# Patient Record
Sex: MALE | Race: WHITE | NOT HISPANIC OR LATINO | Employment: OTHER | URBAN - METROPOLITAN AREA
[De-identification: names, ages, dates, MRNs, and addresses within clinical notes are randomized per-mention and may not be internally consistent; named-entity substitution may affect disease eponyms.]

---

## 2023-01-05 ENCOUNTER — OFFICE VISIT (OUTPATIENT)
Dept: URGENT CARE | Facility: CLINIC | Age: 48
End: 2023-01-05
Payer: OTHER GOVERNMENT

## 2023-01-05 VITALS
RESPIRATION RATE: 18 BRPM | TEMPERATURE: 98 F | HEIGHT: 70 IN | BODY MASS INDEX: 32.21 KG/M2 | SYSTOLIC BLOOD PRESSURE: 127 MMHG | OXYGEN SATURATION: 98 % | DIASTOLIC BLOOD PRESSURE: 76 MMHG | HEART RATE: 71 BPM | WEIGHT: 225 LBS

## 2023-01-05 DIAGNOSIS — J30.9 ALLERGIC RHINITIS, UNSPECIFIED SEASONALITY, UNSPECIFIED TRIGGER: Primary | ICD-10-CM

## 2023-01-05 DIAGNOSIS — J02.9 SORE THROAT: ICD-10-CM

## 2023-01-05 LAB
CTP QC/QA: YES
MOLECULAR STREP A: NEGATIVE
POC MOLECULAR INFLUENZA A AGN: NEGATIVE
POC MOLECULAR INFLUENZA B AGN: NEGATIVE
SARS-COV-2 AG RESP QL IA.RAPID: NEGATIVE

## 2023-01-05 PROCEDURE — 87651 STREP A DNA AMP PROBE: CPT | Mod: QW,S$GLB,, | Performed by: PHYSICIAN ASSISTANT

## 2023-01-05 PROCEDURE — 87651 POCT STREP A MOLECULAR: ICD-10-PCS | Mod: QW,S$GLB,, | Performed by: PHYSICIAN ASSISTANT

## 2023-01-05 PROCEDURE — 87502 POCT INFLUENZA A/B MOLECULAR: ICD-10-PCS | Mod: QW,S$GLB,, | Performed by: PHYSICIAN ASSISTANT

## 2023-01-05 PROCEDURE — 87502 INFLUENZA DNA AMP PROBE: CPT | Mod: QW,S$GLB,, | Performed by: PHYSICIAN ASSISTANT

## 2023-01-05 PROCEDURE — 87811 SARS-COV-2 COVID19 W/OPTIC: CPT | Mod: QW,S$GLB,, | Performed by: PHYSICIAN ASSISTANT

## 2023-01-05 PROCEDURE — 87811 SARS CORONAVIRUS 2 ANTIGEN POCT, MANUAL READ: ICD-10-PCS | Mod: QW,S$GLB,, | Performed by: PHYSICIAN ASSISTANT

## 2023-01-05 PROCEDURE — 99213 OFFICE O/P EST LOW 20 MIN: CPT | Mod: S$GLB,,, | Performed by: PHYSICIAN ASSISTANT

## 2023-01-05 PROCEDURE — 99213 PR OFFICE/OUTPT VISIT, EST, LEVL III, 20-29 MIN: ICD-10-PCS | Mod: S$GLB,,, | Performed by: PHYSICIAN ASSISTANT

## 2023-01-05 RX ORDER — OMEPRAZOLE 20 MG/1
20 CAPSULE, DELAYED RELEASE ORAL DAILY
COMMUNITY

## 2023-01-05 RX ORDER — SERTRALINE HYDROCHLORIDE 100 MG/1
100 TABLET, FILM COATED ORAL DAILY
COMMUNITY

## 2023-01-05 RX ORDER — NAPROXEN 250 MG/1
250 TABLET ORAL 2 TIMES DAILY
COMMUNITY

## 2023-01-05 NOTE — PROGRESS NOTES
" Subjective:       Patient ID: Paulino Marcial is a 47 y.o. male.    Vitals:  height is 5' 10" (1.778 m) and weight is 102.1 kg (225 lb). His oral temperature is 98.3 °F (36.8 °C). His blood pressure is 127/76 and his pulse is 71. His respiration is 18 and oxygen saturation is 98%.     Chief Complaint: Sinus Problem (3 days)    Pt presents with sore throat for past 3 days. He also c/o sinus pressure, cough, congestion and headaches.    Denies fever, body aches, or chills.  No known sick contacts.  Requesting flu, covid and strep test today.    Sinus Problem  This is a new problem. The current episode started yesterday. The problem has been waxing and waning since onset. There has been no fever. His pain is at a severity of 4/10. Associated symptoms include congestion, coughing, ear pain, headaches, sinus pressure and a sore throat.     Constitution: Negative for fever.   HENT:  Positive for ear pain, congestion, sinus pressure and sore throat.    Respiratory:  Positive for cough.    Neurological:  Positive for headaches.     Objective:      Physical Exam   Constitutional: He is oriented to person, place, and time. He appears well-developed. He is cooperative.  Non-toxic appearance. He does not appear ill. No distress.   HENT:   Head: Normocephalic and atraumatic.   Ears:   Right Ear: Hearing, tympanic membrane, external ear and ear canal normal.   Left Ear: Hearing, tympanic membrane, external ear and ear canal normal.   Nose: Mucosal edema and rhinorrhea present. No nasal deformity. No epistaxis. Right sinus exhibits no maxillary sinus tenderness and no frontal sinus tenderness. Left sinus exhibits no maxillary sinus tenderness and no frontal sinus tenderness.   Mouth/Throat: Uvula is midline, oropharynx is clear and moist and mucous membranes are normal. No trismus in the jaw. Normal dentition. No uvula swelling. Cobblestoning present. No oropharyngeal exudate, posterior oropharyngeal edema or posterior oropharyngeal " erythema. No tonsillar exudate.   Eyes: Conjunctivae and lids are normal. No scleral icterus.   Neck: Trachea normal and phonation normal. Neck supple. No edema present. No erythema present. No neck rigidity present.   Cardiovascular: Normal rate, regular rhythm, normal heart sounds and normal pulses.   Pulmonary/Chest: Effort normal and breath sounds normal. No respiratory distress. He has no decreased breath sounds. He has no rhonchi.   Abdominal: Normal appearance.   Musculoskeletal: Normal range of motion.         General: No deformity. Normal range of motion.   Neurological: He is alert and oriented to person, place, and time. He exhibits normal muscle tone. Coordination normal.   Skin: Skin is warm, dry, intact, not diaphoretic and not pale.   Psychiatric: His speech is normal and behavior is normal. Judgment and thought content normal.   Nursing note and vitals reviewed.      Assessment:       1. Allergic rhinitis, unspecified seasonality, unspecified trigger    2. Sore throat          Plan:         Allergic rhinitis, unspecified seasonality, unspecified trigger    Sore throat  -     POCT Strep A, Molecular  -     SARS Coronavirus 2 Antigen, POCT Manual Read  -     POCT Influenza A/B MOLECULAR      Results for orders placed or performed in visit on 01/05/23   POCT Strep A, Molecular   Result Value Ref Range    Molecular Strep A, POC Negative Negative     Acceptable Yes    SARS Coronavirus 2 Antigen, POCT Manual Read   Result Value Ref Range    SARS Coronavirus 2 Antigen Negative Negative     Acceptable Yes    POCT Influenza A/B MOLECULAR   Result Value Ref Range    POC Molecular Influenza A Ag Negative Negative, Not Reported    POC Molecular Influenza B Ag Negative Negative, Not Reported     Acceptable Yes        Daily Flonase and antihistamine recommended.    Increase fluids.  Get plenty of rest.   Normal saline nasal wash to irrigate sinuses and for  congestion/runny nose.  Cool mist humidifier/vaporizer.  Practice good handwashing.  Mucinex for cough and chest congestion.  Tylenol or Ibuprofen for fever, headache and body aches.  Warm salt water gargles for throat comfort.  Chloraseptic spray or lozenges for throat comfort.  See PCP or go to ER if symptoms worsen or fail to improve with treatment.

## 2023-01-05 NOTE — LETTER
January 5, 2023      [facility]  [address]  [place] [address]  Phone: [phone]       Patient: Paulino Marcial   YOB: 1975  Date of Visit: 01/05/2023    To Whom It May Concern:    Jon Marcial  was at [facility] on 01/05/2023. The patient may return to work/school on 1/6/2023 with no restrictions. If you have any questions or concerns, or if I can be of further assistance, please do not hesitate to contact me.    Sincerely,      Jr Farah PA-C

## 2023-05-09 ENCOUNTER — HOSPITAL ENCOUNTER (EMERGENCY)
Facility: HOSPITAL | Age: 48
Discharge: HOME | End: 2023-05-09
Attending: EMERGENCY MEDICINE
Payer: OTHER GOVERNMENT

## 2023-05-09 VITALS
SYSTOLIC BLOOD PRESSURE: 141 MMHG | WEIGHT: 238 LBS | RESPIRATION RATE: 18 BRPM | HEART RATE: 84 BPM | BODY MASS INDEX: 34.15 KG/M2 | TEMPERATURE: 98 F | OXYGEN SATURATION: 98 % | DIASTOLIC BLOOD PRESSURE: 86 MMHG

## 2023-05-09 DIAGNOSIS — R51.9 NONINTRACTABLE HEADACHE, UNSPECIFIED CHRONICITY PATTERN, UNSPECIFIED HEADACHE TYPE: ICD-10-CM

## 2023-05-09 DIAGNOSIS — R42 DIZZINESS: Primary | ICD-10-CM

## 2023-05-09 DIAGNOSIS — T50.905A ADVERSE EFFECT OF DRUG, INITIAL ENCOUNTER: ICD-10-CM

## 2023-05-09 DIAGNOSIS — R03.0 ELEVATED BLOOD PRESSURE READING: ICD-10-CM

## 2023-05-09 PROBLEM — M25.551 CHRONIC PAIN OF BOTH HIPS: Status: ACTIVE | Noted: 2023-04-10

## 2023-05-09 PROBLEM — K21.9 GASTROESOPHAGEAL REFLUX DISEASE WITHOUT ESOPHAGITIS: Status: ACTIVE | Noted: 2023-04-10

## 2023-05-09 PROBLEM — M25.511 CHRONIC RIGHT SHOULDER PAIN: Status: ACTIVE | Noted: 2023-04-10

## 2023-05-09 PROBLEM — G89.29 CHRONIC RIGHT SHOULDER PAIN: Status: ACTIVE | Noted: 2023-04-10

## 2023-05-09 PROBLEM — M25.552 CHRONIC PAIN OF BOTH HIPS: Status: ACTIVE | Noted: 2023-04-10

## 2023-05-09 PROBLEM — G89.29 CHRONIC PAIN OF BOTH HIPS: Status: ACTIVE | Noted: 2023-04-10

## 2023-05-09 PROBLEM — F41.9 ANXIETY: Status: ACTIVE | Noted: 2023-04-10

## 2023-05-09 PROBLEM — J30.89 NON-SEASONAL ALLERGIC RHINITIS: Status: ACTIVE | Noted: 2023-04-10

## 2023-05-09 LAB
ALBUMIN SERPL BCP-MCNC: 4.8 G/DL (ref 3.5–5.2)
ALP SERPL-CCNC: 77 U/L (ref 38–126)
ALT SERPL W/O P-5'-P-CCNC: 96 U/L (ref 10–44)
ANION GAP SERPL CALC-SCNC: 9 MMOL/L (ref 8–16)
AST SERPL-CCNC: 44 U/L (ref 15–46)
BASOPHILS # BLD AUTO: 0.11 K/UL (ref 0–0.2)
BASOPHILS NFR BLD: 1.1 % (ref 0–1.9)
BILIRUB SERPL-MCNC: 0.5 MG/DL (ref 0.1–1)
BILIRUB UR QL STRIP: NEGATIVE
CALCIUM SERPL-MCNC: 9.2 MG/DL (ref 8.7–10.5)
CHLORIDE SERPL-SCNC: 102 MMOL/L (ref 95–110)
CLARITY UR REFRACT.AUTO: CLEAR
CO2 SERPL-SCNC: 30 MMOL/L (ref 23–29)
COLOR UR AUTO: YELLOW
CREAT SERPL-MCNC: 0.95 MG/DL (ref 0.5–1.4)
DIFFERENTIAL METHOD: ABNORMAL
EOSINOPHIL # BLD AUTO: 0.2 K/UL (ref 0–0.5)
EOSINOPHIL NFR BLD: 1.6 % (ref 0–8)
ERYTHROCYTE [DISTWIDTH] IN BLOOD BY AUTOMATED COUNT: 11.9 % (ref 11.5–14.5)
EST. GFR  (NO RACE VARIABLE): >60 ML/MIN/1.73 M^2
GLUCOSE SERPL-MCNC: 111 MG/DL (ref 70–110)
GLUCOSE UR QL STRIP: NEGATIVE
HCT VFR BLD AUTO: 45.6 % (ref 40–54)
HGB BLD-MCNC: 15.6 G/DL (ref 14–18)
HGB UR QL STRIP: NEGATIVE
IMM GRANULOCYTES # BLD AUTO: 0.08 K/UL (ref 0–0.04)
IMM GRANULOCYTES NFR BLD AUTO: 0.8 % (ref 0–0.5)
KETONES UR QL STRIP: NEGATIVE
LEUKOCYTE ESTERASE UR QL STRIP: NEGATIVE
LYMPHOCYTES # BLD AUTO: 1.9 K/UL (ref 1–4.8)
LYMPHOCYTES NFR BLD: 19.7 % (ref 18–48)
MCH RBC QN AUTO: 29.9 PG (ref 27–31)
MCHC RBC AUTO-ENTMCNC: 34.2 G/DL (ref 32–36)
MCV RBC AUTO: 87 FL (ref 82–98)
MONOCYTES # BLD AUTO: 0.9 K/UL (ref 0.3–1)
MONOCYTES NFR BLD: 9.5 % (ref 4–15)
NEUTROPHILS # BLD AUTO: 6.6 K/UL (ref 1.8–7.7)
NEUTROPHILS NFR BLD: 67.3 % (ref 38–73)
NITRITE UR QL STRIP: NEGATIVE
NRBC BLD-RTO: 0 /100 WBC
PH UR STRIP: 8 [PH] (ref 5–8)
PLATELET # BLD AUTO: 259 K/UL (ref 150–450)
PMV BLD AUTO: 10.3 FL (ref 9.2–12.9)
POTASSIUM SERPL-SCNC: 4.2 MMOL/L (ref 3.5–5.1)
PROT SERPL-MCNC: 7.8 G/DL (ref 6–8.4)
PROT UR QL STRIP: NEGATIVE
RBC # BLD AUTO: 5.22 M/UL (ref 4.6–6.2)
SODIUM SERPL-SCNC: 141 MMOL/L (ref 136–145)
SP GR UR STRIP: 1.01 (ref 1–1.03)
TROPONIN I SERPL-MCNC: <0.012 NG/ML (ref 0.01–0.03)
URN SPEC COLLECT METH UR: NORMAL
UROBILINOGEN UR STRIP-ACNC: NEGATIVE EU/DL
UUN UR-MCNC: 15 MG/DL (ref 2–20)
WBC # BLD AUTO: 9.76 K/UL (ref 3.9–12.7)

## 2023-05-09 PROCEDURE — 96374 THER/PROPH/DIAG INJ IV PUSH: CPT | Mod: ER

## 2023-05-09 PROCEDURE — 96361 HYDRATE IV INFUSION ADD-ON: CPT | Mod: ER

## 2023-05-09 PROCEDURE — 93010 EKG 12-LEAD: ICD-10-PCS | Mod: ,,, | Performed by: INTERNAL MEDICINE

## 2023-05-09 PROCEDURE — 80053 COMPREHEN METABOLIC PANEL: CPT | Mod: ER | Performed by: NURSE PRACTITIONER

## 2023-05-09 PROCEDURE — 81003 URINALYSIS AUTO W/O SCOPE: CPT | Mod: ER | Performed by: NURSE PRACTITIONER

## 2023-05-09 PROCEDURE — 94760 N-INVAS EAR/PLS OXIMETRY 1: CPT | Mod: ER

## 2023-05-09 PROCEDURE — 25000003 PHARM REV CODE 250: Mod: ER | Performed by: PHYSICIAN ASSISTANT

## 2023-05-09 PROCEDURE — 93010 ELECTROCARDIOGRAM REPORT: CPT | Mod: ,,, | Performed by: INTERNAL MEDICINE

## 2023-05-09 PROCEDURE — 93005 ELECTROCARDIOGRAM TRACING: CPT | Mod: ER

## 2023-05-09 PROCEDURE — 85025 COMPLETE CBC W/AUTO DIFF WBC: CPT | Mod: ER | Performed by: NURSE PRACTITIONER

## 2023-05-09 PROCEDURE — 63600175 PHARM REV CODE 636 W HCPCS: Mod: ER | Performed by: PHYSICIAN ASSISTANT

## 2023-05-09 PROCEDURE — 96375 TX/PRO/DX INJ NEW DRUG ADDON: CPT | Mod: ER

## 2023-05-09 PROCEDURE — 99285 EMERGENCY DEPT VISIT HI MDM: CPT | Mod: 25,ER

## 2023-05-09 PROCEDURE — 84484 ASSAY OF TROPONIN QUANT: CPT | Mod: ER | Performed by: NURSE PRACTITIONER

## 2023-05-09 PROCEDURE — 99900035 HC TECH TIME PER 15 MIN (STAT): Mod: ER

## 2023-05-09 RX ORDER — DIPHENHYDRAMINE HYDROCHLORIDE 50 MG/ML
12.5 INJECTION INTRAMUSCULAR; INTRAVENOUS
Status: COMPLETED | OUTPATIENT
Start: 2023-05-09 | End: 2023-05-09

## 2023-05-09 RX ORDER — MECLIZINE HCL 12.5 MG 12.5 MG/1
25 TABLET ORAL
Status: COMPLETED | OUTPATIENT
Start: 2023-05-09 | End: 2023-05-09

## 2023-05-09 RX ORDER — KETOROLAC TROMETHAMINE 30 MG/ML
15 INJECTION, SOLUTION INTRAMUSCULAR; INTRAVENOUS
Status: COMPLETED | OUTPATIENT
Start: 2023-05-09 | End: 2023-05-09

## 2023-05-09 RX ADMIN — MECLIZINE 25 MG: 12.5 TABLET ORAL at 04:05

## 2023-05-09 RX ADMIN — KETOROLAC TROMETHAMINE 15 MG: 30 INJECTION, SOLUTION INTRAMUSCULAR; INTRAVENOUS at 04:05

## 2023-05-09 RX ADMIN — SODIUM CHLORIDE 1000 ML: 0.9 INJECTION, SOLUTION INTRAVENOUS at 04:05

## 2023-05-09 RX ADMIN — DIPHENHYDRAMINE HYDROCHLORIDE 12.5 MG: 50 INJECTION INTRAMUSCULAR; INTRAVENOUS at 04:05

## 2023-05-09 NOTE — FIRST PROVIDER EVALUATION
" Emergency Department TeleTriage Encounter Note      CHIEF COMPLAINT    Chief Complaint   Patient presents with    Hypertension     I have been feeling weird since Thursday last week. I went to the clinic at work and my pressure was high and they told me to come here. It was 178/94. I do have PTSD and maybe that is contributing but I just dont feel right. I have tingle ness in the back my head I feel like my vision is off and getting nauseated here and there. It has been since Thursday but has gotten worse.         VITAL SIGNS   Initial Vitals [05/09/23 1502]   BP Pulse Resp Temp SpO2   (!) 154/94 83 18 98.2 °F (36.8 °C) 98 %      MAP       --            ALLERGIES    Review of patient's allergies indicates:   Allergen Reactions    Medrol [methylprednisolone] Anxiety       PROVIDER TRIAGE NOTE  This is a teletriage evaluation of a 48 y.o. male presenting to the ED complaining of elevated BP. Pt reports that he has been feeling "weird" since Thursday of last week. States that he had his BP checked at work today and it was elevated.  Denies CP and SOB. Reports headache since Thursday.  Also reports blurred vision. No pmhx of HTN.     Well-appearing, no distress.  No focal neuro findings.     Initial orders will be placed and care will be transferred to an alternate provider when patient is roomed for a full evaluation. Any additional orders and the final disposition will be determined by that provider.         ORDERS  Labs Reviewed - No data to display    ED Orders (720h ago, onward)      Start Ordered     Status Ordering Provider    05/09/23 1510 05/09/23 1510  Vital signs  Every 15 min         Ordered ROX GONZALES N.    05/09/23 1510 05/09/23 1510  Cardiac Monitoring - Adult  Continuous        Comments: Notify Physician If:    Ordered ROX GONZALES N.    05/09/23 1510 05/09/23 1510  Pulse Oximetry Continuous  Continuous         Ordered ROX GONZALES N.    05/09/23 1510 05/09/23 1510  Diet " NPO  Diet effective now         Ordered JANET ROX N.    05/09/23 1510 05/09/23 1510  Saline lock IV  Once         Ordered ROX GONZALES N.    05/09/23 1510 05/09/23 1510  EKG 12-lead  Once        Comments: Do not perform if previously done during this visit/ triage    Ordered ROX GONZALES N.    05/09/23 1510 05/09/23 1510  CBC auto differential  STAT         Ordered ROX GONZALES N.    05/09/23 1510 05/09/23 1510  Comprehensive metabolic panel  STAT         Ordered ROX GONZALES N.    05/09/23 1510 05/09/23 1510  Troponin I #1  STAT         Ordered ROX GONZALES N.    Unscheduled 05/09/23 1510  Urinalysis, Reflex to Urine Culture Urine, Clean Catch  STAT         Ordered ROX GONZALES N.              Virtual Visit Note: The provider triage portion of this emergency department evaluation and documentation was performed via [employer], a HIPAA-compliant telemedicine application, in concert with a tele-presenter in the room. A face to face patient evaluation with one of my colleagues will occur once the patient is placed in an emergency department room.      DISCLAIMER: This note was prepared with [employer] voice recognition transcription software. Garbled syntax, mangled pronouns, and other bizarre constructions may be attributed to that software system.

## 2023-05-09 NOTE — ED PROVIDER NOTES
"Encounter Date: 5/9/2023       History     Chief Complaint   Patient presents with    Hypertension     I have been feeling weird since Thursday last week. I went to the clinic at work and my pressure was high and they told me to come here. It was 178/94. I do have PTSD and maybe that is contributing but I just dont feel right. I have tingle ness in the back my head I feel like my vision is off and getting nauseated here and there. It has been since Thursday but has gotten worse.       HPI: Paulino Marcial, a 48 y.o. male  has a past medical history of Anxiety, Chronic post-traumatic stress disorder (PTSD), and GERD (gastroesophageal reflux disease).     He presents to the ED for evaluation of other constellation of symptoms.  States that he attest to feeling dizzy, has slight waxing and waning headache as well as some intermittent blurry vision that started on Thursday after he got a cortisone shot in his left shoulder.  States that he went to his job where they checked his blood pressure was found to be elevated.  He has no h/o hypertension.  States that when the last time he took steroids (a medrol dose pack) it opened "pandora's box" concerning his PTSD symptoms and required evaluation in the emergency room.  Denies unilateral numbness/weakness.  States that he is prescribed 100mg Zoloft, however he takes have a tablet and was instructed by his PCP to take a whole table during times of stress. Started to take the full dose of this medication on Thursday  after the onset of symptoms.  Attests to vaping.        The history is provided by the patient.   Review of patient's allergies indicates:   Allergen Reactions    Medrol [methylprednisolone] Anxiety     Past Medical History:   Diagnosis Date    Anxiety     Chronic post-traumatic stress disorder (PTSD)     GERD (gastroesophageal reflux disease)      Past Surgical History:   Procedure Laterality Date    APPENDECTOMY      HIP ARTHROPLASTY      KNEE ARTHROPLASTY   "     History reviewed. No pertinent family history.  Social History     Tobacco Use    Smoking status: Never    Smokeless tobacco: Current   Substance Use Topics    Alcohol use: Yes     Alcohol/week: 2.0 standard drinks     Types: 2 Cans of beer per week     Comment: daily    Drug use: Never     Review of Systems   Constitutional:  Negative for fever.   Eyes:  Positive for visual disturbance. Negative for photophobia.   Respiratory:  Negative for shortness of breath.    Cardiovascular:  Negative for chest pain.   Gastrointestinal:  Positive for nausea. Negative for vomiting.   Skin:  Negative for color change.   Allergic/Immunologic: Negative for immunocompromised state.   Neurological:  Positive for headaches. Negative for weakness and numbness.   Psychiatric/Behavioral:  Negative for agitation.    All other systems reviewed and are negative.    Physical Exam     Initial Vitals [05/09/23 1502]   BP Pulse Resp Temp SpO2   (!) 154/94 83 18 98.2 °F (36.8 °C) 98 %      MAP       --         Physical Exam    Nursing note and vitals reviewed.  Constitutional: He appears well-developed and well-nourished.   HENT:   Head: Normocephalic and atraumatic.   Right Ear: External ear normal.   Left Ear: External ear normal.   Nose: Nose normal.   Mouth/Throat: Oropharynx is clear and moist. Mucous membranes are dry.   Eyes: Right eye exhibits nystagmus. Left eye exhibits no nystagmus.   Neck: Neck supple.   Normal range of motion.  Cardiovascular:  Normal rate and regular rhythm.           Pulmonary/Chest: Breath sounds normal. No respiratory distress.   Abdominal: Abdomen is soft. He exhibits no distension. There is no abdominal tenderness. There is no rebound.   Musculoskeletal:         General: No tenderness or edema. Normal range of motion.      Cervical back: Normal range of motion and neck supple.     Lymphadenopathy:     He has no cervical adenopathy.   Neurological: He is alert and oriented to person, place, and time. No  cranial nerve deficit or sensory deficit. GCS score is 15. GCS eye subscore is 4. GCS verbal subscore is 5. GCS motor subscore is 6.   Skin: Skin is warm and dry. Capillary refill takes less than 2 seconds. No rash and no abscess noted. No erythema.   Psychiatric: He has a normal mood and affect. Thought content normal.       ED Course   Procedures  Labs Reviewed   CBC W/ AUTO DIFFERENTIAL - Abnormal; Notable for the following components:       Result Value    Immature Granulocytes 0.8 (*)     Immature Grans (Abs) 0.08 (*)     All other components within normal limits   COMPREHENSIVE METABOLIC PANEL - Abnormal; Notable for the following components:    CO2 30 (*)     Glucose 111 (*)     ALT 96 (*)     All other components within normal limits   TROPONIN I   URINALYSIS, REFLEX TO URINE CULTURE    Narrative:     Preferred Collection Type->Urine, Clean Catch  Specimen Source->Urine          Imaging Results              X-Ray Chest AP Portable (Final result)  Result time 05/09/23 15:56:50      Final result by Li Rob MD (05/09/23 15:56:50)                   Impression:      No acute abnormality.      Electronically signed by: Li Rob  Date:    05/09/2023  Time:    15:56               Narrative:    EXAMINATION:  XR CHEST AP PORTABLE    CLINICAL HISTORY:  elevated BP reading;    TECHNIQUE:  Single frontal view of the chest was performed.    COMPARISON:  None    FINDINGS:  The lungs are clear, with normal appearance of pulmonary vasculature and no pleural effusion or pneumothorax.    The cardiac silhouette is normal in size. The hilar and mediastinal contours are unremarkable.    Bones are intact.                                       CT Head Without Contrast (Final result)  Result time 05/09/23 15:34:51      Final result by Mauricio Perez MD (05/09/23 15:34:51)                   Impression:      Normal head CT.    All CT scans at this facility are performed  using dose modulation techniques as  appropriate to performed exam including the following:  automated exposure control; adjustment of mA and/or kV according to the patients size (this includes techniques or standardized protocols for targeted exams where dose is matched to indication/reason for exam: i.e. extremities or head);  iterative reconstruction technique.      Electronically signed by: Mauricio Perez MD  Date:    05/09/2023  Time:    15:34               Narrative:    EXAMINATION:  CT HEAD WITHOUT CONTRAST    CLINICAL HISTORY:  Hypertensive emergency;    TECHNIQUE:  Routine noncontrast head CT.    COMPARISON:  None    FINDINGS:  There is no acute intracranial hemorrhage or abnormal extra-axial fluid collection.    There is no abnormal increased or decreased density within the brain parenchyma.  Gray-white differentiation preserved normal ventricles.  There is no intracranial mass or mass effect.    The calvarium is intact.    Visualized paranasal sinuses and mastoid air cells are well aerated.                                       Medications   sodium chloride 0.9% bolus 1,000 mL 1,000 mL (1,000 mLs Intravenous New Bag 5/9/23 1617)   diphenhydrAMINE injection 12.5 mg (has no administration in time range)   meclizine tablet 25 mg (25 mg Oral Given 5/9/23 1617)   ketorolac injection 15 mg (15 mg Intravenous Given 5/9/23 1617)     Medical Decision Making:   Initial Assessment:   Dizziness, HA, Nausea   Differential Diagnosis:   Metabolic derangement, arrhythmia, dehydration, intracranial abnormality   Clinical Tests:   Lab Tests: Ordered and Reviewed  The following lab test(s) were unremarkable: CBC, CMP and Troponin  Radiological Study: Ordered and Reviewed  ED Management:  Pt presents to ED for evaluation of dizziness, HA with subjective decreased vision after cortisone injection.  No concerning abnormalities on CBC, CMP, trop, EKG, CXR and CT of head.  Pt given fluids, Toradol, meclizine and benadryl with improvement of HA.  Likely medication  reaction.  Pt was cautioned against further use of steroids.  Encouraged benadryl administration at home and given reasons to return.  Pt verbalized understanding and agreement with plan.  Case discussed with supervising physician who agrees with plan.                            Clinical Impression:   Final diagnoses:  [R03.0] Elevated blood pressure reading  [R42] Dizziness (Primary)  [T50.905A] Adverse effect of drug, initial encounter               Nichole Vo PA-C  05/09/23 [id number]

## 2023-05-09 NOTE — Clinical Note
"Paulino Nguyen" Aggie was seen and treated in our emergency department on 5/9/2023.  He may return to work on 05/10/2023.       If you have any questions or concerns, please don't hesitate to call.      Nichole Vo PA-C"

## 2023-09-05 ENCOUNTER — TELEPHONE (OUTPATIENT)
Dept: PSYCHIATRY | Facility: CLINIC | Age: 48
End: 2023-09-05
Payer: OTHER GOVERNMENT

## 2023-09-05 NOTE — TELEPHONE ENCOUNTER
----- Message from Nakia Puri sent at 9/5/2023 10:52 AM CDT -----  States he would like to schedule an appt w/ Dr Juhi Padron. Please call pt [phone]. Thank you

## 2023-10-18 ENCOUNTER — OFFICE VISIT (OUTPATIENT)
Dept: URGENT CARE | Facility: CLINIC | Age: 48
End: 2023-10-18
Payer: OTHER GOVERNMENT

## 2023-10-18 VITALS
BODY MASS INDEX: 33.11 KG/M2 | SYSTOLIC BLOOD PRESSURE: 144 MMHG | WEIGHT: 231.25 LBS | DIASTOLIC BLOOD PRESSURE: 88 MMHG | RESPIRATION RATE: 19 BRPM | HEART RATE: 79 BPM | TEMPERATURE: 98 F | HEIGHT: 70 IN | OXYGEN SATURATION: 99 %

## 2023-10-18 DIAGNOSIS — R09.81 NASAL CONGESTION: ICD-10-CM

## 2023-10-18 DIAGNOSIS — R51.9 NONINTRACTABLE HEADACHE, UNSPECIFIED CHRONICITY PATTERN, UNSPECIFIED HEADACHE TYPE: Primary | ICD-10-CM

## 2023-10-18 DIAGNOSIS — R42 DIZZINESS: ICD-10-CM

## 2023-10-18 LAB
CTP QC/QA: YES
CTP QC/QA: YES
POC MOLECULAR INFLUENZA A AGN: NEGATIVE
POC MOLECULAR INFLUENZA B AGN: NEGATIVE
SARS-COV-2 AG RESP QL IA.RAPID: NEGATIVE

## 2023-10-18 PROCEDURE — 99214 PR OFFICE/OUTPT VISIT, EST, LEVL IV, 30-39 MIN: ICD-10-PCS | Mod: S$GLB,,, | Performed by: PHYSICIAN ASSISTANT

## 2023-10-18 PROCEDURE — 99214 OFFICE O/P EST MOD 30 MIN: CPT | Mod: S$GLB,,, | Performed by: PHYSICIAN ASSISTANT

## 2023-10-18 PROCEDURE — 87502 POCT INFLUENZA A/B MOLECULAR: ICD-10-PCS | Mod: QW,S$GLB,, | Performed by: PHYSICIAN ASSISTANT

## 2023-10-18 PROCEDURE — 87811 SARS CORONAVIRUS 2 ANTIGEN POCT, MANUAL READ: ICD-10-PCS | Mod: QW,S$GLB,, | Performed by: PHYSICIAN ASSISTANT

## 2023-10-18 PROCEDURE — 87502 INFLUENZA DNA AMP PROBE: CPT | Mod: QW,S$GLB,, | Performed by: PHYSICIAN ASSISTANT

## 2023-10-18 PROCEDURE — 87811 SARS-COV-2 COVID19 W/OPTIC: CPT | Mod: QW,S$GLB,, | Performed by: PHYSICIAN ASSISTANT

## 2023-10-18 RX ORDER — LORAZEPAM 0.5 MG/1
0.5 TABLET ORAL DAILY PRN
COMMUNITY
Start: 2023-04-10

## 2023-10-18 RX ORDER — RIZATRIPTAN BENZOATE 10 MG/1
10 TABLET ORAL
COMMUNITY
Start: 2023-05-31

## 2023-10-18 RX ORDER — PROMETHAZINE HYDROCHLORIDE 25 MG/1
25 TABLET ORAL EVERY 6 HOURS PRN
Qty: 20 TABLET | Refills: 0 | Status: SHIPPED | OUTPATIENT
Start: 2023-10-18

## 2023-10-18 NOTE — PROGRESS NOTES
"Subjective:      Patient ID: Paulino Marcial is a 48 y.o. male.    Vitals:  height is 5' 10" (1.778 m) and weight is 104.9 kg (231 lb 4.2 oz). His oral temperature is 98.1 °F (36.7 °C). His blood pressure is 144/88 (abnormal) and his pulse is 79. His respiration is 19 and oxygen saturation is 99%.     Chief Complaint: Headache    Presents to urgent care with dizziness, headaches, and mild nasal congestion. Rates headache 6/10 but states currently does not have headache. Symptoms started on 10/16/23. Fever was 99.1 yesterday. Took BC powder & dayquil yesterday with no relief. HA usually frontal and comes & goes. Dizziness described as light-headedness, usually improves with rest. Does have hx of migraines- states it does not feel like typical migraine. Pt states feels like last time he had covid. Denies n/v, ear pain, hearing loss, focal weakness, vision changes, photophobia, cp/sob, fever/chills. No hx of vertigo.     Headache   This is a new problem. The current episode started in the past 7 days. The pain does not radiate. The pain quality is similar to prior headaches. The quality of the pain is described as dull. The pain is at a severity of 6/10. The pain is moderate. Associated symptoms include dizziness and muscle aches. Pertinent negatives include no abdominal pain, blurred vision, coughing, ear pain, fever, loss of balance, nausea, numbness, photophobia, seizures, sore throat, tingling, tinnitus, visual change, vomiting or weakness. Nothing aggravates the symptoms. He has tried NSAIDs for the symptoms. The treatment provided no relief. His past medical history is significant for migraine headaches.       Constitution: Positive for fatigue. Negative for chills, sweating and fever.   HENT:  Positive for congestion. Negative for ear pain, tinnitus and sore throat.    Cardiovascular: Negative.    Eyes: Negative.  Negative for photophobia, vision loss and blurred vision.   Respiratory:  Negative for cough and " shortness of breath.    Gastrointestinal: Negative.  Negative for abdominal pain, nausea and vomiting.   Neurological:  Positive for dizziness, headaches and history of migraines. Negative for history of vertigo, passing out, facial drooping, speech difficulty, loss of balance, numbness, tingling and seizures.      Objective:     Physical Exam   Constitutional: He is oriented to person, place, and time. He appears well-developed.  Non-toxic appearance. He does not appear ill. No distress.   HENT:   Head: Normocephalic and atraumatic.   Ears:   Right Ear: Tympanic membrane, external ear and ear canal normal.   Left Ear: Tympanic membrane, external ear and ear canal normal.   Nose: Nose normal.   Eyes: Conjunctivae, EOM and lids are normal. Pupils are equal, round, and reactive to light. Extraocular movement intact   Neck: Neck supple.   Cardiovascular: Normal rate, regular rhythm and normal heart sounds.   Pulmonary/Chest: Effort normal and breath sounds normal.   Abdominal: Normal appearance.   Musculoskeletal: Normal range of motion.         General: Normal range of motion.      Right lower leg: No edema.      Left lower leg: No edema.   Neurological: no focal deficit. He is alert and oriented to person, place, and time. He has normal sensation and intact cranial nerves (2-12). He displays no weakness, facial symmetry and no dysarthria. He has a normal Finger-Nose-Finger Test. He shows no pronator drift. Gait normal. GCS eye subscore is 4. GCS verbal subscore is 5. GCS motor subscore is 6.   Skin: Skin is warm, dry, not diaphoretic, not pale and no rash.   Psychiatric: His behavior is normal.     Results for orders placed or performed in visit on 10/18/23   SARS Coronavirus 2 Antigen, POCT Manual Read   Result Value Ref Range    SARS Coronavirus 2 Antigen Negative Negative     Acceptable Yes    POCT Influenza A/B MOLECULAR   Result Value Ref Range    POC Molecular Influenza A Ag Negative Negative,  Not Reported    POC Molecular Influenza B Ag Negative Negative, Not Reported     Acceptable Yes        Assessment:     1. Nonintractable headache, unspecified chronicity pattern, unspecified headache type    2. Dizziness    3. Nasal congestion        Plan:     NSAIDs vs Tylenol prn for headache/body ache. Pt also has maxalt at home if needed. Currently not having headache at time of visit. Neurologically intact. Denies cp, sob, or leg swelling. Use promethazine prn for dizziness. Informed pt this medication will cause drowsiness. Rest and drink plenty of fluids. Work note given for today and tomorrow. Close f/u with PCP or ED sooner if any new or worsening symptoms.     Nonintractable headache, unspecified chronicity pattern, unspecified headache type  -     SARS Coronavirus 2 Antigen, POCT Manual Read  -     POCT Influenza A/B MOLECULAR  -     promethazine (PHENERGAN) 25 MG tablet; Take 1 tablet (25 mg total) by mouth every 6 (six) hours as needed (dizziness).  Dispense: 20 tablet; Refill: 0    Dizziness  -     promethazine (PHENERGAN) 25 MG tablet; Take 1 tablet (25 mg total) by mouth every 6 (six) hours as needed (dizziness).  Dispense: 20 tablet; Refill: 0    Nasal congestion

## 2023-10-18 NOTE — LETTER
October 18, 2023      [facility]  [address]  [facility] [facility] [address]  Phone: [phone]       Patient: Paulino Marcial   YOB: 1975  Date of Visit: 10/18/2023    To Whom It May Concern:    Jon Marcial  was at [facility] on 10/18/2023. The patient may return to work/school on 10/20/23 with no restrictions. If you have any questions or concerns, or if I can be of further assistance, please do not hesitate to contact me.    Sincerely,    Martine Mathews PA-C

## 2023-10-21 ENCOUNTER — TELEPHONE (OUTPATIENT)
Dept: URGENT CARE | Facility: CLINIC | Age: 48
End: 2023-10-21
Payer: OTHER GOVERNMENT

## 2023-10-21 NOTE — TELEPHONE ENCOUNTER
Called patient as a courtesy to recent urgent care visit. Patient didn't answer. Left message informing patient of courtesy call and callback number.

## 2023-12-04 ENCOUNTER — TELEPHONE (OUTPATIENT)
Dept: PSYCHIATRY | Facility: CLINIC | Age: 48
End: 2023-12-04
Payer: OTHER GOVERNMENT

## 2023-12-04 NOTE — TELEPHONE ENCOUNTER
----- Message from Brenda Maldonado sent at 12/4/2023 11:42 AM CST -----  Name of Who is Calling:patient           What is the request in detail:requesting to schedule an appt with Karrie Olivas           Can the clinic reply by [facility]:no           What Number to Call Back if not in [facility]: [phone]

## 2024-01-01 ENCOUNTER — OFFICE VISIT (OUTPATIENT)
Dept: URGENT CARE | Facility: CLINIC | Age: 49
End: 2024-01-01
Payer: OTHER GOVERNMENT

## 2024-01-01 VITALS
TEMPERATURE: 98 F | BODY MASS INDEX: 33.07 KG/M2 | WEIGHT: 231 LBS | HEART RATE: 86 BPM | SYSTOLIC BLOOD PRESSURE: 141 MMHG | RESPIRATION RATE: 18 BRPM | HEIGHT: 70 IN | OXYGEN SATURATION: 99 % | DIASTOLIC BLOOD PRESSURE: 90 MMHG

## 2024-01-01 DIAGNOSIS — T23.101A SUPERFICIAL BURN OF RIGHT HAND INCLUDING FINGERS, INITIAL ENCOUNTER: Primary | ICD-10-CM

## 2024-01-01 DIAGNOSIS — F17.200 NEEDS SMOKING CESSATION EDUCATION: ICD-10-CM

## 2024-01-01 DIAGNOSIS — T23.131A SUPERFICIAL BURN OF RIGHT HAND INCLUDING FINGERS, INITIAL ENCOUNTER: Primary | ICD-10-CM

## 2024-01-01 PROCEDURE — 99213 OFFICE O/P EST LOW 20 MIN: CPT | Mod: S$GLB,,, | Performed by: PHYSICIAN ASSISTANT

## 2024-01-01 RX ORDER — SILVER SULFADIAZINE 10 G/1000G
CREAM TOPICAL 2 TIMES DAILY
Qty: 25 G | Refills: 0 | Status: SHIPPED | OUTPATIENT
Start: 2024-01-01

## 2024-01-01 RX ORDER — MUPIROCIN 20 MG/G
OINTMENT TOPICAL 3 TIMES DAILY
Qty: 30 G | Refills: 0 | Status: SHIPPED | OUTPATIENT
Start: 2024-01-01 | End: 2024-01-11

## 2024-01-01 RX ORDER — TESTOSTERONE CYPIONATE 200 MG/ML
200 INJECTION, SOLUTION INTRAMUSCULAR
COMMUNITY

## 2024-01-01 RX ORDER — SILVER SULFADIAZINE 10 G/1000G
CREAM TOPICAL
Status: COMPLETED | OUTPATIENT
Start: 2024-01-01 | End: 2024-01-01

## 2024-01-01 RX ADMIN — SILVER SULFADIAZINE: 10 CREAM TOPICAL at 05:01

## 2024-01-01 NOTE — PROGRESS NOTES
"Subjective:      Patient ID: Paulino Marcial is a 48 y.o. male.    Vitals:  height is 5' 10" (1.778 m) and weight is 104.8 kg (231 lb). His oral temperature is 98 °F (36.7 °C). His blood pressure is 141/90 (abnormal) and his pulse is 86. His respiration is 18 and oxygen saturation is 99%.     Chief Complaint: Burn (To right palm about 3 hours ago)    Patient presents today with a burn onto the right hand after touching a hot pan. The incident happened about 3 hours ago.  He has taken Ibuprofen for pain at this time.  States his tetanus is up to date.  He is able to move hand but has some pain with movement.    Burn  The incident occurred 1 to 3 hours ago. The burns occurred at home. The burns occurred while cooking. The burns were a result of contact with a hot surface. The burns are located on the right hand. The pain is at a severity of 4/10. The pain is mild. He has tried ice, narcotic analgesics and NSAIDs (ibuprofen) for the symptoms. The treatment provided mild relief.       Skin:  Negative for erythema.        +burn right hand      Objective:     Physical Exam   Constitutional: He is oriented to person, place, and time. He appears well-developed.   HENT:   Head: Normocephalic and atraumatic. Head is without abrasion, without contusion and without laceration.   Ears:   Right Ear: External ear normal.   Left Ear: External ear normal.   Nose: Nose normal.   Mouth/Throat: Oropharynx is clear and moist and mucous membranes are normal.   Eyes: Conjunctivae, EOM and lids are normal. Pupils are equal, round, and reactive to light.   Neck: Trachea normal and phonation normal. Neck supple.   Cardiovascular: Normal rate, regular rhythm and normal heart sounds.   Pulmonary/Chest: Effort normal and breath sounds normal. No stridor. No respiratory distress.   Musculoskeletal: Normal range of motion.         General: Normal range of motion.        Hands:    Neurological: He is alert and oriented to person, place, and time. "   Skin: Skin is warm, dry, intact and no rash. Capillary refill takes less than 2 seconds. No abrasion, No burn, No bruising, No erythema and No ecchymosis   Psychiatric: His speech is normal and behavior is normal. Judgment and thought content normal.   Nursing note and vitals reviewed.      Assessment:     1. Superficial burn of right hand including fingers, initial encounter        Plan:       Superficial burn of right hand including fingers, initial encounter  -     silver sulfADIAZINE 1% cream  -     silver sulfADIAZINE 1% (SILVADENE) 1 % cream; Apply topically 2 (two) times daily.  Dispense: 25 g; Refill: 0  -     mupirocin (BACTROBAN) 2 % ointment; Apply topically 3 (three) times daily. for 10 days  Dispense: 30 g; Refill: 0        Applied silvadene in clinic.  Advised to apply 2-3 times per day.  Tylenol and/or Ibuprofen as needed for pain.  Continue with movement of digits to avoid contracture.  Follow up as needed.  RTC with new or worsening symptoms.

## 2024-02-05 ENCOUNTER — PATIENT MESSAGE (OUTPATIENT)
Dept: SMOKING CESSATION | Facility: CLINIC | Age: 49
End: 2024-02-05
Payer: OTHER GOVERNMENT

## 2024-11-04 ENCOUNTER — PATIENT MESSAGE (OUTPATIENT)
Dept: RESEARCH | Facility: HOSPITAL | Age: 49
End: 2024-11-04
Payer: OTHER GOVERNMENT

## 2024-11-13 DIAGNOSIS — M25.571 PAIN IN JOINT INVOLVING RIGHT ANKLE AND FOOT: Primary | ICD-10-CM

## 2024-11-15 ENCOUNTER — OFFICE VISIT (OUTPATIENT)
Dept: ORTHOPEDICS | Facility: CLINIC | Age: 49
End: 2024-11-15
Payer: OTHER GOVERNMENT

## 2024-11-15 ENCOUNTER — HOSPITAL ENCOUNTER (OUTPATIENT)
Dept: RADIOLOGY | Facility: HOSPITAL | Age: 49
Discharge: HOME | End: 2024-11-15
Attending: ORTHOPAEDIC SURGERY
Payer: OTHER GOVERNMENT

## 2024-11-15 DIAGNOSIS — R60.0 EDEMA OF RIGHT FOOT: Primary | ICD-10-CM

## 2024-11-15 DIAGNOSIS — M25.571 PAIN IN JOINT INVOLVING RIGHT ANKLE AND FOOT: ICD-10-CM

## 2024-11-15 PROCEDURE — 73630 X-RAY EXAM OF FOOT: CPT | Mod: TC,RT

## 2024-11-15 PROCEDURE — 99213 OFFICE O/P EST LOW 20 MIN: CPT | Mod: PBBFAC,25 | Performed by: ORTHOPAEDIC SURGERY

## 2024-11-15 PROCEDURE — 99999 PR PBB SHADOW E&M-EST. PATIENT-LVL III: CPT | Mod: PBBFAC,,, | Performed by: ORTHOPAEDIC SURGERY

## 2024-11-15 PROCEDURE — 73610 X-RAY EXAM OF ANKLE: CPT | Mod: TC,RT

## 2024-11-15 PROCEDURE — 73610 X-RAY EXAM OF ANKLE: CPT | Mod: 26,RT,, | Performed by: STUDENT IN AN ORGANIZED HEALTH CARE EDUCATION/TRAINING PROGRAM

## 2024-11-15 PROCEDURE — 73630 X-RAY EXAM OF FOOT: CPT | Mod: 26,RT,, | Performed by: STUDENT IN AN ORGANIZED HEALTH CARE EDUCATION/TRAINING PROGRAM

## 2024-11-15 RX ORDER — MELOXICAM 15 MG/1
TABLET ORAL
Qty: 14 TABLET | Refills: 1 | Status: SHIPPED | OUTPATIENT
Start: 2024-11-15

## 2024-11-15 RX ORDER — DICLOFENAC SODIUM 10 MG/G
GEL TOPICAL
Qty: 100 G | Refills: 1 | Status: SHIPPED | OUTPATIENT
Start: 2024-11-15

## 2024-11-15 RX ORDER — DEXTROAMPHETAMINE SACCHARATE, AMPHETAMINE ASPARTATE, DEXTROAMPHETAMINE SULFATE AND AMPHETAMINE SULFATE 2.5; 2.5; 2.5; 2.5 MG/1; MG/1; MG/1; MG/1
1 TABLET ORAL 2 TIMES DAILY
COMMUNITY
Start: 2024-10-24

## 2024-11-15 RX ORDER — VALSARTAN 40 MG/1
40 TABLET ORAL EVERY MORNING
COMMUNITY

## 2024-11-15 NOTE — PROGRESS NOTES
Dr. Inge Gómez      [address] [place] [address]   Phone [phone]  Fax [phone]           CHIEF COMPLAINT: Pain of the Right Foot and Pain of the Right Ankle       HISTORY OF PRESENT ILLNESS (11/15/2024):    Paulino presents with right lateral hindfoot pain pain that has persisted for at least three weeks and has progressively worsened. He localizes the pain to the lateral aspect of his foot. He recalls rolling his ankle at home but cannot pinpoint a specific injury as the cause. He reports playing basketball and having a long  career, noting it's not uncommon for him to experience foot pain, but states this episode is unusually prolonged.    He describes swelling in his ankle, which he can feel near the Achilles tendon. He mentions taking a significant amount of ibuprofen to manage daily activities, which is atypical for him. He reports feeling pressure throughout his foot with movement, describing it as a sensation of swelling without acute pain.    His activity level has decreased due to the foot pain.  He has never had gout. He cannot take steroid Dosepaks due to issues with his blood pressure.    He denies recalling a specific injury to his foot. He denies having diabetes or a history of gout.    He has taken ibuprofen for pain management.     SURGICAL HISTORY:  - Hip surgery: resulted in cessation of running  - Left knee meniscus repair: Approximately 3 months after hip surgery  - Shoulder surgery: Last year    SOCIAL HISTORY:  - Long  career  - Right-sided body issues due to  service  - Previously ran 40-50 miles per week          PAST MEDICAL HISTORY:    Past Medical History:   Diagnosis Date    Anxiety     Chronic post-traumatic stress disorder (PTSD)     GERD (gastroesophageal reflux disease)        Hemoglobin A1C   Date Value Ref Range Status   08/01/2024 5.7 (H) 4.8 - 5.6 % Final     Comment:              Prediabetes: 5.7 - 6.4            Diabetes: >6.4           Glycemic control for adults with diabetes: <7.0        PAST SURGICAL HISTORY:    Past Surgical History:   Procedure Laterality Date    APPENDECTOMY      HIP ARTHROPLASTY      KNEE ARTHROPLASTY          MEDICATIONS:    Current Outpatient Medications:     dextroamphetamine-amphetamine 10 mg Tab, Take 1 tablet by mouth 2 (two) times daily., Disp: , Rfl:     LORazepam (ATIVAN) 0.5 MG tablet, Take 0.5 mg by mouth daily as needed., Disp: , Rfl:     naproxen (NAPROSYN) 250 MG tablet, Take 250 mg by mouth 2 (two) times daily., Disp: , Rfl:     omeprazole (PRILOSEC) 20 MG capsule, Take 20 mg by mouth once daily., Disp: , Rfl:     promethazine (PHENERGAN) 25 MG tablet, Take 1 tablet (25 mg total) by mouth every 6 (six) hours as needed (dizziness)., Disp: 20 tablet, Rfl: 0    rizatriptan (MAXALT) 10 MG tablet, Take 10 mg by mouth every 2 (two) hours as needed., Disp: , Rfl:     sertraline (ZOLOFT) 100 MG tablet, Take 100 mg by mouth once daily., Disp: , Rfl:     silver sulfADIAZINE 1% (SILVADENE) 1 % cream, Apply topically 2 (two) times daily., Disp: 25 g, Rfl: 0    testosterone cypionate (DEPOTESTOTERONE CYPIONATE) 200 mg/mL injection, Inject 200 mg into the muscle every 14 (fourteen) days., Disp: , Rfl:     valsartan (DIOVAN) 40 MG tablet, Take 40 mg by mouth every morning., Disp: , Rfl:      There are no discontinued medications.      ALLERGIES:     Review of patient's allergies indicates:   Allergen Reactions    Medrol [methylprednisolone] Anxiety            FAMILY HISTORY:   Family History   Problem Relation Name Age of Onset    Thrombosis Neg Hx             SOCIAL HISTORY:    Social History     Socioeconomic History    Marital status:    Tobacco Use    Smoking status: Every Day     Types: Cigarettes, Vaping with nicotine    Smokeless tobacco: Never   Substance and Sexual Activity    Alcohol use: Yes     Alcohol/week: 2.0 standard drinks of alcohol     Types: 2 Cans of beer per week      "Comment: rarely    Drug use: Never         PHYSICAL EXAMINATION:     Estimated body mass index is 33.15 kg/m² as calculated from the following:    Height as of 1/1/24: 5' 10" (1.778 m).    Weight as of 1/1/24: 104.8 kg (231 lb).   ASSISTIVE DEVICE:  None    MUSCULOSKELETAL:    MSK: Foot/Ankle - Right: Swelling over right foot. Swelling in right ankle. Swelling in right Achilles area. Mild swelling throughout hindfoot.  No signs of infection.  The majority of his pain is over his peroneal tendons but he also has medially based pain as well over his deltoid. Sensation is intact to light touch in the saphenous, sural, deep peroneal, superficial peroneal and tibial nerve distributions. Extensor hallucis longus, flexor hallucis longus, tibialis anterior, and gastroc soleus are firing. Palpable DP and PT pulses.         The pain is exacerbated with plantar flexion. His entire foot appears discolored, which he noticed on the day of the visit.       IMAGING:      X-Ray Foot Complete Right  Narrative: EXAMINATION:  XR FOOT COMPLETE 3 VIEW RIGHT    CLINICAL HISTORY:  Pain in right ankle and joints of right foot    TECHNIQUE:  XR FOOT COMPLETE 3 VIEW RIGHT    COMPARISON:  None.    FINDINGS:  No evidence of acute fracture or dislocation.  Joint space narrowing in the D IP and PIP joints.  No radiopaque foreign body seen.  Impression: As above.    Electronically signed by: Herminio Quiroz  Date:    11/15/2024  Time:    09:15  X-Ray Ankle Complete 3 View Right  Narrative: EXAMINATION:  XR ANKLE COMPLETE 3 VIEW RIGHT    CLINICAL HISTORY:  Pain in right ankle and joints of right foot    TECHNIQUE:  XR ANKLE COMPLETE 3 VIEW RIGHT    COMPARISON:  11/15/2024.    FINDINGS:  No evidence of acute fracture or dislocation.  The ankle mortise is preserved.  No radiopaque foreign body seen.  Impression: As above.    Electronically signed by: Herminio Quiroz  Date:    11/15/2024  Time:    09:14           ASSESSMENT: 49 y.o. male  with:   Right " diffuse hindfoot swelling with majority of pain centered over peroneals atraumatic in nature  Can not take steroids due elevations blood pressure caused by steroids    PLAN:  LIFESTYLE:   Apply Voltaren gel 4 times daily after icing.    MEDICATIONS:   Prescribed meloxicam to be taken daily in the morning with food for 2 weeks.  He should discontinue the ibuprofen he has taken   Prescribed Voltaren gel to be applied 4 times daily.  Patient cannot take steroids    DME/WEIGHTBEARING STATUS:   Provide work note for patient to work from home for 3 weeks.   Wear a boot for 2-3 weeks, taking it off only to drive.   Ice the ankle 4 times daily for 20 minutes at a time.    ADVANCED IMAGING:  MRI if no better    WORK STATUS:  - Employed as a  at a chemical plant  - May require working from home for two weeks due to foot condition  - Practitioner suggests writing a work note for patient to work from home     FOLLOW UP:   Follow up in 2-3 weeks to reassess condition.  Images needed   Will order MRI if ankle condition has not improved at follow-up visit.       This note was generated with the assistance of ambient listening technology. Verbal consent was obtained by the patient and accompanying visitor(s) for the recording of patient appointment to facilitate this note. I attest to having reviewed and edited the generated note for accuracy, though some syntax or spelling errors may persist. Please contact the author of this note for any clarification.              Physician Signature: Inge Gómez M.D.       Official Website  Schedule An Appointment

## 2024-11-15 NOTE — LETTER
November 15, 2024      [facility]  [address]  [place] [address]  Phone: [phone]  Fax: [phone]       Patient: Paulino Marcial   YOB: 1975  Date of Visit: 11/15/2024    To Whom It May Concern:    Jon Marcial  was at [facility] on 11/15/2024. The patient may return to work/school with restrictions. Patient must work from home because he need to wear boot for 3 weeks. If you have any questions or concerns, or if I can be of further assistance, please do not hesitate to contact me.    Sincerely,     Inge Gómez MD

## 2024-11-22 ENCOUNTER — OFFICE VISIT (OUTPATIENT)
Dept: ORTHOPEDICS | Facility: CLINIC | Age: 49
End: 2024-11-22
Payer: OTHER GOVERNMENT

## 2024-11-22 DIAGNOSIS — S86.311A PERONEAL TENDON TEAR, RIGHT, INITIAL ENCOUNTER: ICD-10-CM

## 2024-11-22 DIAGNOSIS — R60.0 EDEMA OF RIGHT FOOT: Primary | ICD-10-CM

## 2024-11-22 PROCEDURE — 99999 PR PBB SHADOW E&M-EST. PATIENT-LVL III: CPT | Mod: PBBFAC,,, | Performed by: ORTHOPAEDIC SURGERY

## 2024-11-22 PROCEDURE — 99213 OFFICE O/P EST LOW 20 MIN: CPT | Mod: PBBFAC | Performed by: ORTHOPAEDIC SURGERY

## 2024-11-22 NOTE — LETTER
November 22, 2024      [facility]  [address]  [place] [address]  Phone: [phone]  Fax: [phone]       Patient: Paulino Marcial   YOB: 1975  Date of Visit: 11/22/2024    To Whom It May Concern:    Jon Marcial  was at [facility] on 11/22/2024. The patient may return to work/school on 11/25/2024 with no restrictions. If you have any questions or concerns, or if I can be of further assistance, please do not hesitate to contact me.    Sincerely,        Inge Gómez MD

## 2024-11-22 NOTE — PROGRESS NOTES
"         Dr. Inge Gómez      [address] [facility] [place] [address]   Phone [phone]  Fax [phone]           CHIEF COMPLAINT: Pain of the Right Ankle    HISTORY OF PRESENT ILLNESS (11/22/2024):  HPI:  Paulino presents for follow-up regarding an ankle injury. He reports improvement, stating that it's significantly better than before and he can now walk without much of a limp. He has been wearing a boot, which he believes has helped. He is currently taking meloxicam for pain management.    He reports ongoing pain in a specific area of the ankle, with a particular incident of pain waking him up the previous night. He describes the pain as feeling like "a pair of needles." Paulino typically experiences pain when flexing his foot, and by the end of the day as the meloxicam starts to wear off. He's experiencing some pain, but it's significantly less than when he first started treatment.    The initial injury occurred when he turned his ankle at home, but he mentions experiencing similar incidents in the past without significant issues. He expresses concern about the duration of this particular episode, stating that it has taken longer to heal than any previous incidents.    Paulino denies significant pain when moving his foot outward or holding it in that position. Paulino denies any visible bruising from the initial injury.            HISTORY OF PRESENT ILLNESS (11/15/2024):    aPulino presents with right lateral hindfoot pain pain that has persisted for at least three weeks and has progressively worsened. He localizes the pain to the lateral aspect of his foot. He recalls rolling his ankle at home but cannot pinpoint a specific injury as the cause. He reports playing basketball and having a long  career, noting it's not uncommon for him to experience foot pain, but states this episode is unusually prolonged.    He describes swelling in his ankle, which he can feel near the Achilles " tendon. He mentions taking a significant amount of ibuprofen to manage daily activities, which is atypical for him. He reports feeling pressure throughout his foot with movement, describing it as a sensation of swelling without acute pain.    His activity level has decreased due to the foot pain.  He has never had gout. He cannot take steroid Dosepaks due to issues with his blood pressure.    He denies recalling a specific injury to his foot. He denies having diabetes or a history of gout.    He has taken ibuprofen for pain management.     SURGICAL HISTORY:  - Hip surgery: resulted in cessation of running  - Left knee meniscus repair: Approximately 3 months after hip surgery  - Shoulder surgery: Last year    SOCIAL HISTORY:  - Long  career  - Right-sided body issues due to  service  - Previously ran 40-50 miles per week          PAST MEDICAL HISTORY:    Past Medical History:   Diagnosis Date    Anxiety     Chronic post-traumatic stress disorder (PTSD)     GERD (gastroesophageal reflux disease)     HTN (hypertension)        Hemoglobin A1C   Date Value Ref Range Status   08/01/2024 5.7 (H) 4.8 - 5.6 % Final     Comment:              Prediabetes: 5.7 - 6.4           Diabetes: >6.4           Glycemic control for adults with diabetes: <7.0        PAST SURGICAL HISTORY:    Past Surgical History:   Procedure Laterality Date    APPENDECTOMY      HIP ARTHROPLASTY      KNEE ARTHROPLASTY          MEDICATIONS:    Current Outpatient Medications:     dextroamphetamine-amphetamine 10 mg Tab, Take 1 tablet by mouth 2 (two) times daily., Disp: , Rfl:     diclofenac sodium (VOLTAREN ARTHRITIS PAIN) 1 % Gel, Use small amount to affected area four times daily as needed for pain, Disp: 100 g, Rfl: 1    LORazepam (ATIVAN) 0.5 MG tablet, Take 0.5 mg by mouth daily as needed., Disp: , Rfl:     meloxicam (MOBIC) 15 MG tablet, Take 1 tablet daily with food as needed for pain, Disp: 14 tablet, Rfl: 1    omeprazole (PRILOSEC)  "20 MG capsule, Take 20 mg by mouth once daily., Disp: , Rfl:     rizatriptan (MAXALT) 10 MG tablet, Take 10 mg by mouth every 2 (two) hours as needed., Disp: , Rfl:     valsartan (DIOVAN) 40 MG tablet, Take 40 mg by mouth every morning., Disp: , Rfl:     promethazine (PHENERGAN) 25 MG tablet, Take 1 tablet (25 mg total) by mouth every 6 (six) hours as needed (dizziness). (Patient not taking: Reported on 11/22/2024), Disp: 20 tablet, Rfl: 0    sertraline (ZOLOFT) 100 MG tablet, Take 100 mg by mouth once daily. (Patient not taking: Reported on 11/22/2024), Disp: , Rfl:     silver sulfADIAZINE 1% (SILVADENE) 1 % cream, Apply topically 2 (two) times daily. (Patient not taking: Reported on 11/22/2024), Disp: 25 g, Rfl: 0    testosterone cypionate (DEPOTESTOTERONE CYPIONATE) 200 mg/mL injection, Inject 200 mg into the muscle every 14 (fourteen) days. (Patient not taking: Reported on 11/22/2024), Disp: , Rfl:      There are no discontinued medications.      ALLERGIES:     Review of patient's allergies indicates:   Allergen Reactions    Medrol [methylprednisolone] Anxiety            FAMILY HISTORY:   Family History   Problem Relation Name Age of Onset    Thrombosis Neg Hx             SOCIAL HISTORY:    Social History     Socioeconomic History    Marital status:    Tobacco Use    Smoking status: Every Day     Types: Vaping with nicotine    Smokeless tobacco: Former   Substance and Sexual Activity    Alcohol use: Yes     Alcohol/week: 2.0 standard drinks of alcohol     Types: 2 Cans of beer per week     Comment: rarely    Drug use: Never         PHYSICAL EXAMINATION:     Estimated body mass index is 33.15 kg/m² as calculated from the following:    Height as of 1/1/24: 5' 10" (1.778 m).    Weight as of 1/1/24: 104.8 kg (231 lb).   ASSISTIVE DEVICE:  None    MUSCULOSKELETAL:    MSK: Foot/Ankle - Right: Swelling over right foot   Has improved since last visit. Swelling in right ankle. Mild swelling throughout hindfoot.  " No signs of infection.  The majority of his pain is over his peroneal tendons but he also has medially based pain as well over his deltoid. Sensation is intact to light touch in the saphenous, sural, deep peroneal, superficial peroneal and tibial nerve distributions. Extensor hallucis longus, flexor hallucis longus, tibialis anterior, and gastroc soleus are firing. Palpable DP and PT pulses.         The pain is exacerbated with plantar flexion.     MSK: Foot/Ankle - Left: Tenderness on flexion. Pain on inward foot movement. No pain on outward foot movement and holding.            IMAGING:      X-Ray Foot Complete Right  Narrative: EXAMINATION:  XR FOOT COMPLETE 3 VIEW RIGHT    CLINICAL HISTORY:  Pain in right ankle and joints of right foot    TECHNIQUE:  XR FOOT COMPLETE 3 VIEW RIGHT    COMPARISON:  None.    FINDINGS:  No evidence of acute fracture or dislocation.  Joint space narrowing in the D IP and PIP joints.  No radiopaque foreign body seen.  Impression: As above.    Electronically signed by: Herminio Quiroz  Date:    11/15/2024  Time:    09:15  X-Ray Ankle Complete 3 View Right  Narrative: EXAMINATION:  XR ANKLE COMPLETE 3 VIEW RIGHT    CLINICAL HISTORY:  Pain in right ankle and joints of right foot    TECHNIQUE:  XR ANKLE COMPLETE 3 VIEW RIGHT    COMPARISON:  11/15/2024.    FINDINGS:  No evidence of acute fracture or dislocation.  The ankle mortise is preserved.  No radiopaque foreign body seen.  Impression: As above.    Electronically signed by: Herminio Quiroz  Date:    11/15/2024  Time:    09:14           ASSESSMENT: 49 y.o. male  with:   Right diffuse hindfoot swelling with majority of pain centered over peroneals atraumatic in nature  Can not take steroids due elevations blood pressure caused by steroids    PLAN:  MEDICATIONS:  - Meloxicam: Taken at night, provides pain relief but starts to wear off by the end of the day  - Voltaren: Has contributed to improvement in his condition   Patient cannot take  steroids    DME/WEIGHTBEARING STATUS:    he can discontinue short cam walker    ADVANCED IMAGING:  MRI if no better    WORK STATUS:  - Employed as a  at a chemical plant  -  Returning to work on Monday    DME/WEIGHTBEARING STATUS:   Provide work note for patient to return to work on Monday. Return to full duty work starting Monday.    IMAGING ORDERS:   Will order MRI if patient experiences issues after returning to work.    FOLLOW UP:   Cancel the appointment scheduled for the day after Thanksgiving. Follow up via phone call or Kudo message if symptoms persist or worsen after returning to work.  In that case we would get an MRI.          This note was generated with the assistance of ambient listening technology. Verbal consent was obtained by the patient and accompanying visitor(s) for the recording of patient appointment to facilitate this note. I attest to having reviewed and edited the generated note for accuracy, though some syntax or spelling errors may persist. Please contact the author of this note for any clarification.              Physician Signature: Inge Gómez M.D.       Official Website  Schedule An Appointment

## 2024-12-02 ENCOUNTER — PATIENT MESSAGE (OUTPATIENT)
Dept: ORTHOPEDICS | Facility: CLINIC | Age: 49
End: 2024-12-02
Payer: OTHER GOVERNMENT

## 2024-12-04 DIAGNOSIS — M25.571 RIGHT ANKLE PAIN: Primary | ICD-10-CM

## 2024-12-06 ENCOUNTER — HOSPITAL ENCOUNTER (OUTPATIENT)
Dept: RADIOLOGY | Facility: HOSPITAL | Age: 49
Discharge: HOME | End: 2024-12-06
Attending: ORTHOPAEDIC SURGERY
Payer: OTHER GOVERNMENT

## 2024-12-06 DIAGNOSIS — M25.571 RIGHT ANKLE PAIN: ICD-10-CM

## 2024-12-06 PROCEDURE — 73721 MRI JNT OF LWR EXTRE W/O DYE: CPT | Mod: TC,PN,RT

## 2024-12-06 PROCEDURE — 73721 MRI JNT OF LWR EXTRE W/O DYE: CPT | Mod: 26,RT,, | Performed by: RADIOLOGY

## 2024-12-10 ENCOUNTER — OFFICE VISIT (OUTPATIENT)
Dept: ORTHOPEDICS | Facility: CLINIC | Age: 49
End: 2024-12-10
Payer: OTHER GOVERNMENT

## 2024-12-10 VITALS — BODY MASS INDEX: 28.06 KG/M2 | WEIGHT: 196 LBS | HEIGHT: 70 IN

## 2024-12-10 DIAGNOSIS — R60.0 EDEMA OF RIGHT FOOT: Primary | ICD-10-CM

## 2024-12-10 PROCEDURE — 99213 OFFICE O/P EST LOW 20 MIN: CPT | Mod: PBBFAC | Performed by: ORTHOPAEDIC SURGERY

## 2024-12-10 PROCEDURE — 99214 OFFICE O/P EST MOD 30 MIN: CPT | Mod: S$PBB,,, | Performed by: ORTHOPAEDIC SURGERY

## 2024-12-10 PROCEDURE — 99999 PR PBB SHADOW E&M-EST. PATIENT-LVL III: CPT | Mod: PBBFAC,,, | Performed by: ORTHOPAEDIC SURGERY

## 2024-12-11 RX ORDER — NAPROXEN 500 MG/1
500 TABLET ORAL 2 TIMES DAILY
Qty: 60 TABLET | Refills: 0 | Status: SHIPPED | OUTPATIENT
Start: 2024-12-11

## 2024-12-11 NOTE — PROGRESS NOTES
Dr. Inge Gómez      [address], [place] [address]   Phone [phone]  Fax [phone]           CHIEF COMPLAINT: Pain of the Right Ankle      HISTORY OF PRESENT ILLNESS (12/10/2024):  Paulino presents FOR AN MRI REVIEW with ankle pain that started about a month and a half ago in October after rolling his ankle, which felt different from previous ankle rolls. He states that this time it felt different. The pain is significantly better than it was, but he believes he's starting to compensate, causing pain underneath the ankle. He reports some improvement with ibuprofen use but expresses frustration with his limited ability to engage in activities.    He mentions feeling occasional pressure and pain on the top of his foot, which he believes might be related to a cyst identified on the MRI. He has a history of low vitamin D levels and is concerned about his ability to lift weights and engage in other activities. He describes wearing various types of shoes, including Nikes, Jordans, Air Force Ones, and Vans, and mentions using Superfeet inserts.    The prescribed meloxicam is not effective for pain relief, with the patient stating its efficacy diminishes about jail through the day. He denies any concerns for cancer, medical history of diabetes, hypertension, or other chronic conditions.    MEDICATIONS:  - Meloxicam: Not effective, especially by jail through the day  - Vitamin D: History of low levels  - Ibuprofen: Provides much more relief          HISTORY OF PRESENT ILLNESS (11/22/2024):  HPI:  Paulino presents for follow-up regarding an ankle injury. He reports improvement, stating that it's significantly better than before and he can now walk without much of a limp. He has been wearing a boot, which he believes has helped. He is currently taking meloxicam for pain management.    He reports ongoing pain in a specific area of the ankle, with a particular incident of pain  "waking him up the previous night. He describes the pain as feeling like "a pair of needles." Paulino typically experiences pain when flexing his foot, and by the end of the day as the meloxicam starts to wear off. He's experiencing some pain, but it's significantly less than when he first started treatment.    The initial injury occurred when he turned his ankle at home, but he mentions experiencing similar incidents in the past without significant issues. He expresses concern about the duration of this particular episode, stating that it has taken longer to heal than any previous incidents.    Paulino denies significant pain when moving his foot outward or holding it in that position. Paulino denies any visible bruising from the initial injury.            HISTORY OF PRESENT ILLNESS (11/15/2024):    Paulino presents with right lateral hindfoot pain pain that has persisted for at least three weeks and has progressively worsened. He localizes the pain to the lateral aspect of his foot. He recalls rolling his ankle at home but cannot pinpoint a specific injury as the cause. He reports playing basketball and having a long  career, noting it's not uncommon for him to experience foot pain, but states this episode is unusually prolonged.    He describes swelling in his ankle, which he can feel near the Achilles tendon. He mentions taking a significant amount of ibuprofen to manage daily activities, which is atypical for him. He reports feeling pressure throughout his foot with movement, describing it as a sensation of swelling without acute pain.    His activity level has decreased due to the foot pain.  He has never had gout. He cannot take steroid Dosepaks due to issues with his blood pressure.    He denies recalling a specific injury to his foot. He denies having diabetes or a history of gout.    He has taken ibuprofen for pain management.     SURGICAL HISTORY:  - Hip surgery: resulted in cessation of running  - " Left knee meniscus repair: Approximately 3 months after hip surgery  - Shoulder surgery: Last year    SOCIAL HISTORY:  - Long  career  - Right-sided body issues due to  service  - Previously ran 40-50 miles per week          PAST MEDICAL HISTORY:    Past Medical History:   Diagnosis Date    Anxiety     Chronic post-traumatic stress disorder (PTSD)     GERD (gastroesophageal reflux disease)     HTN (hypertension)        Hemoglobin A1C   Date Value Ref Range Status   08/01/2024 5.7 (H) 4.8 - 5.6 % Final     Comment:              Prediabetes: 5.7 - 6.4           Diabetes: >6.4           Glycemic control for adults with diabetes: <7.0        PAST SURGICAL HISTORY:    Past Surgical History:   Procedure Laterality Date    APPENDECTOMY      HIP ARTHROPLASTY      KNEE ARTHROPLASTY          MEDICATIONS:    Current Outpatient Medications:     dextroamphetamine-amphetamine 10 mg Tab, Take 1 tablet by mouth 2 (two) times daily., Disp: , Rfl:     diclofenac sodium (VOLTAREN ARTHRITIS PAIN) 1 % Gel, Use small amount to affected area four times daily as needed for pain, Disp: 100 g, Rfl: 1    LORazepam (ATIVAN) 0.5 MG tablet, Take 0.5 mg by mouth daily as needed., Disp: , Rfl:     omeprazole (PRILOSEC) 20 MG capsule, Take 20 mg by mouth once daily., Disp: , Rfl:     rizatriptan (MAXALT) 10 MG tablet, Take 10 mg by mouth every 2 (two) hours as needed., Disp: , Rfl:     valsartan (DIOVAN) 40 MG tablet, Take 40 mg by mouth every morning., Disp: , Rfl:     meloxicam (MOBIC) 15 MG tablet, Take 1 tablet daily with food as needed for pain (Patient not taking: Reported on 12/10/2024), Disp: 14 tablet, Rfl: 1    naproxen (NAPROSYN) 500 MG tablet, Take 1 tablet (500 mg total) by mouth 2 (two) times daily. As needed with food., Disp: 60 tablet, Rfl: 0    promethazine (PHENERGAN) 25 MG tablet, Take 1 tablet (25 mg total) by mouth every 6 (six) hours as needed (dizziness). (Patient not taking: Reported on 12/10/2024), Disp: 20  "tablet, Rfl: 0    sertraline (ZOLOFT) 100 MG tablet, Take 100 mg by mouth once daily. (Patient not taking: Reported on 12/10/2024), Disp: , Rfl:     silver sulfADIAZINE 1% (SILVADENE) 1 % cream, Apply topically 2 (two) times daily. (Patient not taking: Reported on 12/10/2024), Disp: 25 g, Rfl: 0    testosterone cypionate (DEPOTESTOTERONE CYPIONATE) 200 mg/mL injection, Inject 200 mg into the muscle every 14 (fourteen) days. (Patient not taking: Reported on 12/10/2024), Disp: , Rfl:      There are no discontinued medications.      ALLERGIES:     Review of patient's allergies indicates:   Allergen Reactions    Medrol [methylprednisolone] Anxiety            FAMILY HISTORY:   Family History   Problem Relation Name Age of Onset    Thrombosis Neg Hx             SOCIAL HISTORY:    Social History     Socioeconomic History    Marital status:    Tobacco Use    Smoking status: Every Day     Types: Vaping with nicotine    Smokeless tobacco: Former   Substance and Sexual Activity    Alcohol use: Yes     Alcohol/week: 2.0 standard drinks of alcohol     Types: 2 Cans of beer per week     Comment: rarely    Drug use: Never         PHYSICAL EXAMINATION:     Estimated body mass index is 28.12 kg/m² as calculated from the following:    Height as of this encounter: 5' 10" (1.778 m).    Weight as of this encounter: 88.9 kg (196 lb).   ASSISTIVE DEVICE:  None    MUSCULOSKELETAL:    MSK: Foot/Ankle - Right: Swelling over right lateral mid and hind foot   No signs of infection.  The majority of his pain is over his peroneal tendons as well as his calcaneocuboid joint but he also has medially based pain as well over his deltoid. Sensation is intact to light touch in the saphenous, sural, deep peroneal, superficial peroneal and tibial nerve distributions. Extensor hallucis longus, flexor hallucis longus, tibialis anterior, and gastroc soleus are firing. Palpable DP and PT pulses.         The pain is exacerbated with plantar flexion. "               IMAGING:      MRI Ankle Without Contrast Right  Narrative: EXAM: MRI ANKLE WITHOUT CONTRAST RIGHT    CLINICAL HISTORY: Right ankle pain    TECHNIQUE: Multiplanar multisequence imaging of the right ankle is performed without intravenous contrast.    FINDINGS:  There is a prominent anterior calcaneal process.  There is marked bone marrow edema in the anterior process of the calcaneus and sustentaculum alyson without fracture.  There is a small calcaneal cuboid joint effusion.  There is a synovial cyst arising from the dorsal margin of the talonavicular joint measuring 2.5 x 1.3 x 1.4 cm.  There is no fracture or avascular necrosis.  No tibiotalar joint effusion.    The plantar fascia is normal.  The Achilles tendon, peroneal tendons, and tendons the anterior and medial compartment are intact.  The high ankle ligaments, deltoid ligament, spring ligament, ATFL, posterior talofibular ligament, and calcaneofibular ligament are all intact.  The Lisfranc ligament is intact.  Impression: 1.  Bilateral marrow edema in the anterior process of the calcaneus and sustentaculum alyson without fracture.  This may be due to stress response.  2.  Small calcaneal cuboid joint effusion.  3.  Synovial cyst arising the dorsal margin of the talonavicular joint measuring 2.5 x 1.4 x 1.3 cm.  4.  Intact ligaments and tendons.    Finalized on: 12/6/2024 10:29 AM By:  Jean Pineda MD  [facility]# [id number]      2024-12-06 10:31:[id number]    [facility]           ASSESSMENT: 49 y.o. male  with:   Right diffuse hindfoot swelling with majority of pain centered over peroneals atraumatic in nature but has been present since October 20, 2024 with MRI on 12/06/2024 demonstrating:  Bilateral marrow edema in the anterior process of the calcaneus and sustentaculum alyson without fracture.  This may be due to stress response., Small calcaneal cuboid joint effusion, Synovial cyst arising the dorsal margin of the talonavicular joint measuring 2.5 x 1.4 x 1.3 cm,   Intact ligaments and tendons.  Can not take steroids due elevations blood pressure caused by steroids    PLAN:    MEDICATIONS PRESCRIBED:   Prescribe naproxen, twice daily medication, as an alternative to meloxicam which has not been working well for him   he can not take steroids   Suggest taking vitamin D supplements to speed up bone healing.    DME/WEIGHTBEARING STATUS:   Recommend wearing supportive shoes like Hoka Bondi, New Balance, or Villalobos for 4-6 months or until feeling better.   Advise against wearing less supportive shoes like Jordans, Air Force Ones, or Vans for at least 6 months.   Recommend gradually returning to running only when foot feels good in supportive shoes and there is no pain during or after activity.      REFERRAL:  -he is given a prescription to [facility]    EDUCATION:  I had a long discussion with the patient about the causes, treatments, and prognosis/natural history for calcaneal stress response.  We discussed effective ways to improve symptoms as well as what types of activities may make the symptoms/prognosis worse. We discussed signs and symptoms and other reasons to return to clinic sooner.    RETURN TO CLINIC:  -in 2-3 months    IMAGES NEEDED AT FOLLOW-UP:  -none            This note was generated with the assistance of ambient listening technology. Verbal consent was obtained by the patient and accompanying visitor(s) for the recording of patient appointment to facilitate this note. I attest to having reviewed and edited the generated note for accuracy, though some syntax or spelling errors may persist. Please contact the author of this note for any clarification.              Physician Signature: Inge Gómez M.D.       Official Website  Schedule An Appointment

## 2025-01-04 NOTE — PROGRESS NOTES
Dr. Inge Gómez      [address], [place] [address]   Phone [phone]  Fax [phone]           CHIEF COMPLAINT: Pain, Numbness, and Swelling of the Right Foot and Pain and Swelling of the Right Ankle      HISTORY OF PRESENT ILLNESS (01/07/2025):  Paulino presents with ongoing foot and ankle issues, experiencing swelling in his ankle that has been moving to different areas. He reports tingling on the bottom of his foot and side of his toes. Paulino notes significant difficulty walking two days ago. The swelling is primarily in the lateral ankle area.    He mentions having seven anchors in his hip, which causes discomfort. He tried wearing a boot previously, which helped initially, but stopped using it when he returned to work. He explains that he is a  and finds it difficult to be inactive.    Paulino reports taking vitamin D3 K2 supplements, about 10,000 IU a day. 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    He mentions having rolled his ankle in October, which may be the cause of his current issues. He expresses concern about the possibility of cancer.    Paulino reports experiencing pain in the underneath part of the ankle and heel, which he associates with bone bruising. He mentions having had this before but notes that the current issues are more in the upper part of his foot and ankle. Paulino also mentions having a cyst in his ankle.    Paulino's medical history includes right knee surgery, seven anchors in his hip, and shoulder surgery for an impingement where part of his clavicle was removed. He attributes some of these issues to his  service, particularly carrying gear on his right side.    Paulino denies pain over a specific area when asked about his hip. Paulino denies any medical history of cancer.    MEDICATIONS:  - Vitamin D3  K2: 10,000 IU daily    SURGICAL HISTORY:  - Right knee surgery  - Hip surgery: seven anchors placed  - Shoulder surgery: part of the clavicle bone (about an inch) removed due to an impingement          HISTORY OF PRESENT ILLNESS (12/10/2024):  Paulino presents FOR AN MRI REVIEW with ankle pain that started about a month and a half ago in October after rolling his ankle, which felt different from previous ankle rolls. He states that this time it felt different. The pain is significantly better than it was, but he believes he's starting to compensate, causing pain underneath the ankle. He reports some improvement with ibuprofen use but expresses frustration with his limited ability to engage in activities.    He mentions feeling occasional pressure and pain on the top of his foot, which he believes might be related to a cyst identified on the MRI. He has a history of low vitamin D levels and is concerned about his ability to lift weights and engage in other activities. He describes wearing various types of shoes, including Nikes, Jordans, Air Force Ones, and Vans, and mentions using Superfeet inserts.    The prescribed meloxicam is not effective for pain relief, with the patient stating its efficacy diminishes about residential through the day. He denies any concerns for cancer, medical history of diabetes, hypertension, or other chronic conditions.    MEDICATIONS:  - Meloxicam: Not effective, especially by residential through the day  - Vitamin D: History of low levels  - Ibuprofen: Provides much more relief          HISTORY OF PRESENT ILLNESS (11/22/2024):  HPI:  Paulino presents for follow-up regarding an ankle injury. He reports improvement, stating that it's significantly better than before and he can now walk without much of a limp. He has been wearing a boot, which he believes has helped. He is currently taking meloxicam for pain management.    He reports ongoing pain in a specific area of the ankle, with a particular  "incident of pain waking him up the previous night. He describes the pain as feeling like "a pair of needles." Paulino typically experiences pain when flexing his foot, and by the end of the day as the meloxicam starts to wear off. He's experiencing some pain, but it's significantly less than when he first started treatment.    The initial injury occurred when he turned his ankle at home, but he mentions experiencing similar incidents in the past without significant issues. He expresses concern about the duration of this particular episode, stating that it has taken longer to heal than any previous incidents.    Paulino denies significant pain when moving his foot outward or holding it in that position. Paulino denies any visible bruising from the initial injury.            HISTORY OF PRESENT ILLNESS (11/15/2024):    Paulino presents with right lateral hindfoot pain pain that has persisted for at least three weeks and has progressively worsened. He localizes the pain to the lateral aspect of his foot. He recalls rolling his ankle at home but cannot pinpoint a specific injury as the cause. He reports playing basketball and having a long  career, noting it's not uncommon for him to experience foot pain, but states this episode is unusually prolonged.    He describes swelling in his ankle, which he can feel near the Achilles tendon. He mentions taking a significant amount of ibuprofen to manage daily activities, which is atypical for him. He reports feeling pressure throughout his foot with movement, describing it as a sensation of swelling without acute pain.    His activity level has decreased due to the foot pain.  He has never had gout. He cannot take steroid Dosepaks due to issues with his blood pressure.    He denies recalling a specific injury to his foot. He denies having diabetes or a history of gout.    He has taken ibuprofen for pain management.     SURGICAL HISTORY:  - Hip surgery: resulted in " cessation of running  - Left knee meniscus repair: Approximately 3 months after hip surgery  - Shoulder surgery: Last year    SOCIAL HISTORY:  - Long  career  - Right-sided body issues due to  service  - Previously ran 40-50 miles per week          PAST MEDICAL HISTORY:    Past Medical History:   Diagnosis Date    Anxiety     Chronic post-traumatic stress disorder (PTSD)     GERD (gastroesophageal reflux disease)     HTN (hypertension)        Hemoglobin A1C   Date Value Ref Range Status   08/01/2024 5.7 (H) 4.8 - 5.6 % Final     Comment:              Prediabetes: 5.7 - 6.4           Diabetes: >6.4           Glycemic control for adults with diabetes: <7.0        PAST SURGICAL HISTORY:    Past Surgical History:   Procedure Laterality Date    APPENDECTOMY      HIP ARTHROPLASTY      KNEE ARTHROPLASTY          MEDICATIONS:    Current Outpatient Medications:     dextroamphetamine-amphetamine 10 mg Tab, Take 1 tablet by mouth 2 (two) times daily., Disp: , Rfl:     diclofenac sodium (VOLTAREN ARTHRITIS PAIN) 1 % Gel, Use small amount to affected area four times daily as needed for pain, Disp: 100 g, Rfl: 1    LORazepam (ATIVAN) 0.5 MG tablet, Take 0.5 mg by mouth daily as needed., Disp: , Rfl:     naproxen (NAPROSYN) 500 MG tablet, Take 1 tablet (500 mg total) by mouth 2 (two) times daily. As needed with food., Disp: 60 tablet, Rfl: 0    omeprazole (PRILOSEC) 20 MG capsule, Take 20 mg by mouth once daily., Disp: , Rfl:     rizatriptan (MAXALT) 10 MG tablet, Take 10 mg by mouth every 2 (two) hours as needed., Disp: , Rfl:     valsartan (DIOVAN) 40 MG tablet, Take 40 mg by mouth every morning., Disp: , Rfl:     meloxicam (MOBIC) 15 MG tablet, Take 1 tablet daily with food as needed for pain (Patient not taking: Reported on 1/6/2025), Disp: 14 tablet, Rfl: 1    promethazine (PHENERGAN) 25 MG tablet, Take 1 tablet (25 mg total) by mouth every 6 (six) hours as needed (dizziness). (Patient not taking: Reported on  "11/22/2024), Disp: 20 tablet, Rfl: 0    sertraline (ZOLOFT) 100 MG tablet, Take 100 mg by mouth once daily. (Patient not taking: Reported on 11/22/2024), Disp: , Rfl:     silver sulfADIAZINE 1% (SILVADENE) 1 % cream, Apply topically 2 (two) times daily. (Patient not taking: Reported on 11/22/2024), Disp: 25 g, Rfl: 0    testosterone cypionate (DEPOTESTOTERONE CYPIONATE) 200 mg/mL injection, Inject 200 mg into the muscle every 14 (fourteen) days. (Patient not taking: Reported on 11/22/2024), Disp: , Rfl:      There are no discontinued medications.      ALLERGIES:     Review of patient's allergies indicates:   Allergen Reactions    Medrol [methylprednisolone] Anxiety            FAMILY HISTORY:   Family History   Problem Relation Name Age of Onset    Thrombosis Neg Hx             SOCIAL HISTORY:    Social History     Socioeconomic History    Marital status:    Tobacco Use    Smoking status: Every Day     Types: Vaping with nicotine    Smokeless tobacco: Former   Substance and Sexual Activity    Alcohol use: Yes     Alcohol/week: 2.0 standard drinks of alcohol     Types: 2 Cans of beer per week     Comment: rarely    Drug use: Never         PHYSICAL EXAMINATION:     Estimated body mass index is 28.12 kg/m² as calculated from the following:    Height as of 12/10/24: 5' 10" (1.778 m).    Weight as of 12/10/24: 88.9 kg (196 lb).   ASSISTIVE DEVICE:  None    MUSCULOSKELETAL:    MSK: Foot/Ankle - Right: Swelling over right lateral mid and hind foot   No signs of infection.  The majority of his pain is over his peroneal tendons as well as his calcaneocuboid joint.  His medially based pain has resolved.  Sensation is intact to light touch in the saphenous, sural, deep peroneal, superficial peroneal and tibial nerve distributions. Extensor hallucis longus, flexor hallucis longus, tibialis anterior, and gastroc soleus are firing. Palpable DP and PT pulses.       The pain is exacerbated with plantar flexion.   No calf pain " with squeezing            IMAGING:      MRI Ankle Without Contrast Right  Narrative: EXAM: MRI ANKLE WITHOUT CONTRAST RIGHT    CLINICAL HISTORY: Right ankle pain    TECHNIQUE: Multiplanar multisequence imaging of the right ankle is performed without intravenous contrast.    FINDINGS:  There is a prominent anterior calcaneal process.  There is marked bone marrow edema in the anterior process of the calcaneus and sustentaculum alyson without fracture.  There is a small calcaneal cuboid joint effusion.  There is a synovial cyst arising from the dorsal margin of the talonavicular joint measuring 2.5 x 1.3 x 1.4 cm.  There is no fracture or avascular necrosis.  No tibiotalar joint effusion.    The plantar fascia is normal.  The Achilles tendon, peroneal tendons, and tendons the anterior and medial compartment are intact.  The high ankle ligaments, deltoid ligament, spring ligament, ATFL, posterior talofibular ligament, and calcaneofibular ligament are all intact.  The Lisfranc ligament is intact.  Impression: 1.  Bilateral marrow edema in the anterior process of the calcaneus and sustentaculum alyson without fracture.  This may be due to stress response.  2.  Small calcaneal cuboid joint effusion.  3.  Synovial cyst arising the dorsal margin of the talonavicular joint measuring 2.5 x 1.4 x 1.3 cm.  4.  Intact ligaments and tendons.    Finalized on: 12/6/2024 10:29 AM By:  Jean Pineda MD  [facility]# [id number]      2024-12-06 10:31:[id number]    [facility]           ASSESSMENT: 49 y.o. male  with:   Right diffuse hindfoot swelling with majority of pain centered over peroneals atraumatic in nature but has been present since October 20, 2024 after a trauma with MRI on 12/06/2024 demonstrating:  Bilateral marrow edema in the anterior process of the calcaneus and sustentaculum alyson without fracture.  This may be due to stress response., Small calcaneal cuboid joint effusion, Synovial cyst arising the dorsal margin of the talonavicular joint  measuring 2.5 x 1.4 x 1.3 cm,  Intact ligaments and tendons.  Can not take steroids due elevations blood pressure caused by steroids    PLAN:  MEDICATIONS PRESCRIBED:   previously prescribed naproxen, twice daily medication, as an alternative to meloxicam which has not been working well for him   he cannot take steroids   continue taking vitamin D supplements to speed up bone healing.    DME/WEIGHTBEARING STATUS:   Recommend wearing supportive shoes like Hoka Bondi, New Balance, or Villalobos for 4-6 months or until feeling better.   he is hesitant to wear the boot as it keeps him out of work and he does not do well out of work due to PTSD  -compression socks hurt him worse    REFERRAL:  -he was previously given a prescription to [facility]    LABS:  -I have ordered ESR CRP rheumatoid factor GIOVANNI and vitamin-D     WORK:  -he can not work with the boot on.  He is very resistant to missing work because when he sits at home he developed symptoms of PTSD    EDUCATION:  -I had a long discussion with the patient about the causes, treatments, and prognosis/natural history for cuboid stress response.  We discussed effective ways to improve symptoms as well as what types of activities may make the symptoms/prognosis worse. We discussed signs and symptoms and other reasons to return to clinic sooner.  -I counseled him to give it more time and to slow down his activities.  He is hesitant to slowing down.    RETURN TO CLINIC:  -in 2-3 months    IMAGES NEEDED AT FOLLOW-UP:  -none           This note was generated with the assistance of ambient listening technology. Verbal consent was obtained by the patient and accompanying visitor(s) for the recording of patient appointment to facilitate this note. I attest to having reviewed and edited the generated note for accuracy, though some syntax or spelling errors may persist. Please contact the author of this note for any clarification.              Physician Signature: Inge Gómez,  M.D.       Official Website  Schedule An Appointment

## 2025-01-06 ENCOUNTER — OFFICE VISIT (OUTPATIENT)
Dept: ORTHOPEDICS | Facility: CLINIC | Age: 50
End: 2025-01-06

## 2025-01-06 ENCOUNTER — LAB VISIT (OUTPATIENT)
Dept: LAB | Facility: HOSPITAL | Age: 50
End: 2025-01-06
Attending: ORTHOPAEDIC SURGERY

## 2025-01-06 DIAGNOSIS — M25.471 RIGHT ANKLE SWELLING: ICD-10-CM

## 2025-01-06 DIAGNOSIS — M25.471 RIGHT ANKLE SWELLING: Primary | ICD-10-CM

## 2025-01-06 LAB — ERYTHROCYTE [SEDIMENTATION RATE] IN BLOOD BY PHOTOMETRIC METHOD: <2 MM/HR (ref 0–23)

## 2025-01-06 PROCEDURE — 85652 RBC SED RATE AUTOMATED: CPT | Performed by: ORTHOPAEDIC SURGERY

## 2025-01-06 PROCEDURE — 99999 PR PBB SHADOW E&M-EST. PATIENT-LVL III: CPT | Mod: PBBFAC,,, | Performed by: ORTHOPAEDIC SURGERY

## 2025-01-06 PROCEDURE — 99213 OFFICE O/P EST LOW 20 MIN: CPT | Mod: S$PBB,,, | Performed by: ORTHOPAEDIC SURGERY

## 2025-01-06 PROCEDURE — 86431 RHEUMATOID FACTOR QUANT: CPT | Performed by: ORTHOPAEDIC SURGERY

## 2025-01-06 PROCEDURE — 86141 C-REACTIVE PROTEIN HS: CPT | Performed by: ORTHOPAEDIC SURGERY

## 2025-01-06 PROCEDURE — 82652 VIT D 1 25-DIHYDROXY: CPT | Performed by: ORTHOPAEDIC SURGERY

## 2025-01-06 PROCEDURE — 86038 ANTINUCLEAR ANTIBODIES: CPT | Performed by: ORTHOPAEDIC SURGERY

## 2025-01-06 PROCEDURE — 36415 COLL VENOUS BLD VENIPUNCTURE: CPT | Performed by: ORTHOPAEDIC SURGERY

## 2025-01-06 PROCEDURE — 99213 OFFICE O/P EST LOW 20 MIN: CPT | Mod: PBBFAC | Performed by: ORTHOPAEDIC SURGERY

## 2025-01-07 LAB
ANA SER QL IF: NORMAL
CRP SERPL-MCNC: 3.32 MG/L (ref 0–3.19)
RHEUMATOID FACT SERPL-ACNC: <13 IU/ML (ref 0–15)

## 2025-01-09 LAB — 1,25(OH)2D3 SERPL-MCNC: 69 PG/ML (ref 20–79)

## 2025-01-10 ENCOUNTER — TELEPHONE (OUTPATIENT)
Dept: ORTHOPEDICS | Facility: CLINIC | Age: 50
End: 2025-01-10
Payer: COMMERCIAL

## 2025-01-10 NOTE — TELEPHONE ENCOUNTER
Spoke with pt informed him per Dr. Gómez Your Vitamin D looks fine and does not require any change in treatment. Please contact me if you have any additional concerns. Pt v/u.

## 2025-01-10 NOTE — TELEPHONE ENCOUNTER
----- Message from Summer sent at 1/9/2025  2:35 PM CST -----  Contact: Paulino  Naveen called asking for advice about Calcitriol [[id number]]. He has not received the results for this test and he wants to know if you want him to do a Vitamin D test. Please call patient at [phone]. Thanks!

## 2025-01-27 ENCOUNTER — OFFICE VISIT (OUTPATIENT)
Dept: ORTHOPEDICS | Facility: CLINIC | Age: 50
End: 2025-01-27
Payer: OTHER GOVERNMENT

## 2025-01-27 VITALS — WEIGHT: 196 LBS | BODY MASS INDEX: 28.12 KG/M2

## 2025-01-27 DIAGNOSIS — M25.579 PAIN IN UNSPECIFIED ANKLE AND JOINTS OF UNSPECIFIED FOOT: ICD-10-CM

## 2025-01-27 DIAGNOSIS — M25.571 RIGHT ANKLE PAIN, UNSPECIFIED CHRONICITY: Primary | ICD-10-CM

## 2025-01-27 DIAGNOSIS — R93.7 BONE MARROW EDEMA: ICD-10-CM

## 2025-01-27 PROCEDURE — 99214 OFFICE O/P EST MOD 30 MIN: CPT | Mod: PBBFAC | Performed by: ORTHOPAEDIC SURGERY

## 2025-01-27 PROCEDURE — 99214 OFFICE O/P EST MOD 30 MIN: CPT | Mod: S$PBB,,, | Performed by: ORTHOPAEDIC SURGERY

## 2025-01-27 PROCEDURE — 99999 PR PBB SHADOW E&M-EST. PATIENT-LVL IV: CPT | Mod: PBBFAC,,, | Performed by: ORTHOPAEDIC SURGERY

## 2025-01-27 RX ORDER — LIDOCAINE 50 MG/G
1 PATCH TOPICAL DAILY
Qty: 15 PATCH | Refills: 1 | Status: SHIPPED | OUTPATIENT
Start: 2025-01-27

## 2025-01-27 NOTE — LETTER
January 27, 2025      [facility]  [address]  [place] [address]  Phone: [phone]  Fax: [phone]       Patient: Paulino Marcial   YOB: 1975  Date of Visit: 01/27/2025    To Whom It May Concern:    Jon Marcial  was at [facility] on 01/27/2025. The patient may return to work/school on 1/28/2025 with no restrictions. If you have any questions or concerns, or if I can be of further assistance, please do not hesitate to contact me.    Sincerely,           Physician Signature: Inge Gómez M.D.

## 2025-01-27 NOTE — PROGRESS NOTES
Dr. Inge Gómez      [address], [place] [address]   Phone [phone]  Fax [phone]           CHIEF COMPLAINT: Pain of the Right Ankle  HISTORY OF PRESENT ILLNESS JN (01/27/2025):    Paulino presents with right foot pain and swelling from an injury in October. Pain has been worsening, described as severe enough to wake him from sleep. He reports taking up to 3200 mg of ibuprofen daily for pain management, stating that he cannot walk without medication due to the severe pain. Ice therapy 3-4 times daily and a TENS machine 1-2 times daily provide minimal relief. He also uses Voltaren gel, an anti-inflammatory topical treatment which he reports also does not work    An MRI two months ago revealed bone bruising, soft tissue swelling, and cysts in the foot. Paulino has been taking high doses of Vitamin D (up to 20,000 IUs daily) and K2 supplements. He sometimes experiences itching in the affected area, which he attributes to the swelling.    He expresses frustration with the slow healing process and the impact on his daily life, particularly his ability to work. He states that this injury has been more challenging to manage than previous significant injuries. His medical history includes a C5 fracture, major hip surgery, and knee surgery.    Paulino denies sheets or light touch bothering him at night.  The sheets on his bed do not bother him    PREVIOUS TREATMENTS:  - Ice therapy: 3-4 times daily, minimal relief  - TENS machine: 1-2 times daily, minimal relief  - Voltaren gel: Anti-inflammatory topical treatment    MEDICATIONS:  - Ibuprofen: 3200 mg daily since October  - Omeprazole: daily  - Vitamin D: 20,000 IU daily with K2  - Naproxen: alternative to ibuprofen    SURGICAL HISTORY:  - Hip surgery  - Knee surgery  - C5 fracture            HISTORY OF PRESENT ILLNESS (01/06/2025):  Paulino presents with ongoing foot and ankle issues, experiencing swelling in his ankle that has been  moving to different areas. He reports tingling on the bottom of his foot and side of his toes. Paulino notes significant difficulty walking two days ago. The swelling is primarily in the lateral ankle area.    He mentions having seven anchors in his hip, which causes discomfort. He tried wearing a boot previously, which helped initially, but stopped using it when he returned to work. He explains that he is a  and finds it difficult to be inactive.    Paulino reports taking vitamin D3 K2 supplements, about 10,000 IU a day. 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    He mentions having rolled his ankle in October, which may be the cause of his current issues. He expresses concern about the possibility of cancer.    Paulino reports experiencing pain in the underneath part of the ankle and heel, which he associates with bone bruising. He mentions having had this before but notes that the current issues are more in the upper part of his foot and ankle. Paulino also mentions having a cyst in his ankle.    Paulino's medical history includes right knee surgery, seven anchors in his hip, and shoulder surgery for an impingement where part of his clavicle was removed. He attributes some of these issues to his  service, particularly carrying gear on his right side.    Paulino denies pain over a specific area when asked about his hip. Paulino denies any medical history of cancer.    MEDICATIONS:  - Vitamin D3 K2: 10,000 IU daily    SURGICAL HISTORY:  - Right knee surgery  - Hip surgery: seven anchors placed  - Shoulder surgery: part of the clavicle bone (about an inch) removed due to an impingement          HISTORY OF PRESENT ILLNESS (12/10/2024):  Paulino presents FOR AN MRI REVIEW with ankle pain that started about a month and a half ago in October after rolling his ankle, which felt  "different from previous ankle rolls. He states that this time it felt different. The pain is significantly better than it was, but he believes he's starting to compensate, causing pain underneath the ankle. He reports some improvement with ibuprofen use but expresses frustration with his limited ability to engage in activities.    He mentions feeling occasional pressure and pain on the top of his foot, which he believes might be related to a cyst identified on the MRI. He has a history of low vitamin D levels and is concerned about his ability to lift weights and engage in other activities. He describes wearing various types of shoes, including Nikes, Jordans, [employer] Ones, and Vans, and mentions using Superfeet inserts.    The prescribed meloxicam is not effective for pain relief, with the patient stating its efficacy diminishes about detention through the day. He denies any concerns for cancer, medical history of diabetes, hypertension, or other chronic conditions.    MEDICATIONS:  - Meloxicam: Not effective, especially by detention through the day  - Vitamin D: History of low levels  - Ibuprofen: Provides much more relief          HISTORY OF PRESENT ILLNESS (11/22/2024):  HPI:  Paulino presents for follow-up regarding an ankle injury. He reports improvement, stating that it's significantly better than before and he can now walk without much of a limp. He has been wearing a boot, which he believes has helped. He is currently taking meloxicam for pain management.    He reports ongoing pain in a specific area of the ankle, with a particular incident of pain waking him up the previous night. He describes the pain as feeling like "a pair of needles." Paulino typically experiences pain when flexing his foot, and by the end of the day as the meloxicam starts to wear off. He's experiencing some pain, but it's significantly less than when he first started treatment.    The initial injury occurred when he turned his ankle at " home, but he mentions experiencing similar incidents in the past without significant issues. He expresses concern about the duration of this particular episode, stating that it has taken longer to heal than any previous incidents.    Paulino denies significant pain when moving his foot outward or holding it in that position. Paulino denies any visible bruising from the initial injury.            HISTORY OF PRESENT ILLNESS (11/15/2024):    Paulino presents with right lateral hindfoot pain pain that has persisted for at least three weeks and has progressively worsened. He localizes the pain to the lateral aspect of his foot. He recalls rolling his ankle at home but cannot pinpoint a specific injury as the cause. He reports playing basketball and having a long  career, noting it's not uncommon for him to experience foot pain, but states this episode is unusually prolonged.    He describes swelling in his ankle, which he can feel near the Achilles tendon. He mentions taking a significant amount of ibuprofen to manage daily activities, which is atypical for him. He reports feeling pressure throughout his foot with movement, describing it as a sensation of swelling without acute pain.    His activity level has decreased due to the foot pain.  He has never had gout. He cannot take steroid Dosepaks due to issues with his blood pressure.    He denies recalling a specific injury to his foot. He denies having diabetes or a history of gout.    He has taken ibuprofen for pain management.     SURGICAL HISTORY:  - Hip surgery: resulted in cessation of running  - Left knee meniscus repair: Approximately 3 months after hip surgery  - Shoulder surgery: Last year    SOCIAL HISTORY:  - Long  career  - Right-sided body issues due to  service  - Previously ran 40-50 miles per week          PAST MEDICAL HISTORY:    Past Medical History:   Diagnosis Date    Anxiety     Chronic post-traumatic stress disorder (PTSD)      GERD (gastroesophageal reflux disease)     HTN (hypertension)        Hemoglobin A1C   Date Value Ref Range Status   08/01/2024 5.7 (H) 4.8 - 5.6 % Final     Comment:              Prediabetes: 5.7 - 6.4           Diabetes: >6.4           Glycemic control for adults with diabetes: <7.0        PAST SURGICAL HISTORY:    Past Surgical History:   Procedure Laterality Date    APPENDECTOMY      HIP ARTHROPLASTY      KNEE ARTHROPLASTY          MEDICATIONS:    Current Outpatient Medications:     dextroamphetamine-amphetamine 10 mg Tab, Take 1 tablet by mouth 2 (two) times daily., Disp: , Rfl:     diclofenac sodium (VOLTAREN ARTHRITIS PAIN) 1 % Gel, Use small amount to affected area four times daily as needed for pain, Disp: 100 g, Rfl: 1    LIDOcaine (LIDODERM) 5 %, Place 1 patch onto the skin once daily. 12 hours on/12 hours off, Disp: 15 patch, Rfl: 1    LORazepam (ATIVAN) 0.5 MG tablet, Take 0.5 mg by mouth daily as needed., Disp: , Rfl:     meloxicam (MOBIC) 15 MG tablet, Take 1 tablet daily with food as needed for pain (Patient not taking: Reported on 1/6/2025), Disp: 14 tablet, Rfl: 1    naproxen (NAPROSYN) 500 MG tablet, Take 1 tablet (500 mg total) by mouth 2 (two) times daily. As needed with food., Disp: 60 tablet, Rfl: 0    omeprazole (PRILOSEC) 20 MG capsule, Take 20 mg by mouth once daily., Disp: , Rfl:     promethazine (PHENERGAN) 25 MG tablet, Take 1 tablet (25 mg total) by mouth every 6 (six) hours as needed (dizziness). (Patient not taking: Reported on 11/22/2024), Disp: 20 tablet, Rfl: 0    rizatriptan (MAXALT) 10 MG tablet, Take 10 mg by mouth every 2 (two) hours as needed., Disp: , Rfl:     sertraline (ZOLOFT) 100 MG tablet, Take 100 mg by mouth once daily. (Patient not taking: Reported on 11/22/2024), Disp: , Rfl:     silver sulfADIAZINE 1% (SILVADENE) 1 % cream, Apply topically 2 (two) times daily. (Patient not taking: Reported on 11/22/2024), Disp: 25 g, Rfl: 0    testosterone cypionate  "(DEPOTESTOTERONE CYPIONATE) 200 mg/mL injection, Inject 200 mg into the muscle every 14 (fourteen) days. (Patient not taking: Reported on 11/22/2024), Disp: , Rfl:     valsartan (DIOVAN) 40 MG tablet, Take 40 mg by mouth every morning., Disp: , Rfl:      There are no discontinued medications.      ALLERGIES:     Review of patient's allergies indicates:   Allergen Reactions    Medrol [methylprednisolone] Anxiety            FAMILY HISTORY:   Family History   Problem Relation Name Age of Onset    Thrombosis Neg Hx             SOCIAL HISTORY:    Social History     Socioeconomic History    Marital status:    Tobacco Use    Smoking status: Every Day     Types: Vaping with nicotine    Smokeless tobacco: Former   Substance and Sexual Activity    Alcohol use: Yes     Alcohol/week: 2.0 standard drinks of alcohol     Types: 2 Cans of beer per week     Comment: rarely    Drug use: Never         PHYSICAL EXAMINATION:     Estimated body mass index is 28.12 kg/m² as calculated from the following:    Height as of 12/10/24: 5' 10" (1.778 m).    Weight as of this encounter: 88.9 kg (195 lb 15.8 oz).   ASSISTIVE DEVICE:  Boot    Ankle Exam (affected extremity):   Inspection:         Gross deformity   (-)         Erythema   (-)        Ecchymosis   (-)          Swelling   (+) diffusely over hindfoot and midfoot but moderate only in the lateral aspect of the ankle           Open wounds   (-)         Surgical scars   (-)                    Palpation tenderness:  Bony:  Hindfoot:  Proximal fibula  (-)  Calcaneus  (+)   Distal fibula  (-)  Talus   (+)   Medial malleolus  (-)  CC joint/cuboid  (+)   Tibiotalar joint  (-)  Lateral gutter  (-)  Plantar fascia  (-)  Medial gutter  (-)   Midfoot:   TN joint   (-)   NC joints   (-)   TMT joints   (-)   Forefoot:   (-)      Soft tissue:     Tendons:    Achilles midsubstance (-)  Peroneal tendons  (+)   Achilles insertion  (-)  Posterior tibial tendon (-)   Retrocalcaneal bursa (-)  Anterior " tibial tendon  (-)   Ligaments:   ATFL   (+)  Deltoid   (-)   CFL   (+)  Syndesmosis  (-)  ROM:  Affected Ankle:         DF:    10°        PF:    40°                 Pain    (+)       Crepitance   (-)       Sensory:  Normal sensation to light touch in Sa/Cordon/DP/SP/T nerve distributions      Motor:               Fires EHL/FHL/Tibialis anterior/Gastrocsoleus   Vascular:  Foot WWP with brisk capillary refill    DP/PT pulses palpable  Special Tests:  Montes   (-)  Anterior drawer   (-)  Calcaneal squeeze  (+)  Syndesmotic squeeze  (-)        IMAGING:      MRI Ankle Without Contrast Right  Narrative: EXAM: MRI ANKLE WITHOUT CONTRAST RIGHT    CLINICAL HISTORY: Right ankle pain    TECHNIQUE: Multiplanar multisequence imaging of the right ankle is performed without intravenous contrast.    FINDINGS:  There is a prominent anterior calcaneal process.  There is marked bone marrow edema in the anterior process of the calcaneus and sustentaculum alyson without fracture.  There is a small calcaneal cuboid joint effusion.  There is a synovial cyst arising from the dorsal margin of the talonavicular joint measuring 2.5 x 1.3 x 1.4 cm.  There is no fracture or avascular necrosis.  No tibiotalar joint effusion.    The plantar fascia is normal.  The Achilles tendon, peroneal tendons, and tendons the anterior and medial compartment are intact.  The high ankle ligaments, deltoid ligament, spring ligament, ATFL, posterior talofibular ligament, and calcaneofibular ligament are all intact.  The Lisfranc ligament is intact.  Impression: 1.  Bilateral marrow edema in the anterior process of the calcaneus and sustentaculum alyson without fracture.  This may be due to stress response.  2.  Small calcaneal cuboid joint effusion.  3.  Synovial cyst arising the dorsal margin of the talonavicular joint measuring 2.5 x 1.4 x 1.3 cm.  4.  Intact ligaments and tendons.    Finalized on: 12/6/2024 10:29 AM By:  Jean Pineda MD  [facility]# [id number]       2024-12-06 10:31:[id number]    [facility]           ASSESSMENT: 49 y.o. male  with:   Right diffuse hindfoot swelling with majority of pain centered over peroneals present since October 20, 2024 after a trauma with MRI on 12/06/2024 demonstrating:  Bilateral marrow edema in the anterior process of the calcaneus and sustentaculum alyson without fracture.  This may be due to stress response., Small calcaneal cuboid joint effusion, Synovial cyst arising the dorsal margin of the talonavicular joint measuring 2.5 x 1.4 x 1.3 cm,  Intact ligaments and tendons.  Can not take steroids due elevations blood pressure caused by steroids  Normal vitamin-D  Negative rheumatoid factor and GIOVANNI    PLAN:  MEDICATIONS PRESCRIBED:   previously prescribed naproxen, twice daily medication, as an alternative to meloxicam which has not been working well for him.  He is also taking too many anti-inflammatories.  He needs to slow down on taking anti-inflammatories   he cannot take steroids   continue taking vitamin D supplements to speed up bone healing.  However currently is taking too much vitamin-D  Given lidocaine patches today    DME/WEIGHTBEARING STATUS:   Recommend wearing supportive shoes like Hoka Bondi, New Balance, or Villalobos for 4-6 months or until feeling better.   he is hesitant to wear the boot as it keeps him out of work and he does not do well out of work due to PTSD  -compression socks hurt him worse    REFERRAL:  -he was previously given a prescription to [facility]  -to pain management given his extreme pain and inability to do much    LABS:  -we reviewed his labs today    WORK:  -he can not work with the boot on.  He is very resistant to missing work because when he sits at home he developed symptoms of PTSD  -however, today he asked for a note to release him full duty to work despite having severe pain level    ADVANCED IMAGING  -I would like to get a repeat MRI of his ankle to evaluate whether or not the bone bruising is improving  or worsening    EDUCATION:  -I had a long discussion with the patient about the causes, treatments, and prognosis/natural history for cuboid stress response.  We discussed effective ways to improve symptoms as well as what types of activities may make the symptoms/prognosis worse. We discussed signs and symptoms and other reasons to return to clinic sooner.  -I counseled him to give it more time and to slow down his activities.  He is hesitant to slowing down.    RETURN TO CLINIC:  -after MRI    IMAGES NEEDED AT FOLLOW-UP:  -none           This note was generated with the assistance of ambient listening technology. Verbal consent was obtained by the patient and accompanying visitor(s) for the recording of patient appointment to facilitate this note. I attest to having reviewed and edited the generated note for accuracy, though some syntax or spelling errors may persist. Please contact the author of this note for any clarification.              Physician Signature: Inge Gómez M.D.       Official Website  Schedule An Appointment

## 2025-01-29 ENCOUNTER — OFFICE VISIT (OUTPATIENT)
Dept: PAIN MEDICINE | Facility: CLINIC | Age: 50
End: 2025-01-29
Payer: OTHER GOVERNMENT

## 2025-01-29 VITALS
RESPIRATION RATE: 17 BRPM | WEIGHT: 207.88 LBS | HEART RATE: 88 BPM | HEIGHT: 70 IN | DIASTOLIC BLOOD PRESSURE: 84 MMHG | SYSTOLIC BLOOD PRESSURE: 124 MMHG | BODY MASS INDEX: 29.76 KG/M2

## 2025-01-29 DIAGNOSIS — M77.50 CALCANEAL BURSITIS, UNSPECIFIED LATERALITY: Primary | ICD-10-CM

## 2025-01-29 DIAGNOSIS — M25.571 RIGHT ANKLE PAIN, UNSPECIFIED CHRONICITY: ICD-10-CM

## 2025-01-29 PROBLEM — G47.33 OSA ON CPAP: Chronic | Status: ACTIVE | Noted: 2023-05-31

## 2025-01-29 PROBLEM — F43.10 POSTTRAUMATIC STRESS DISORDER: Status: ACTIVE | Noted: 2021-10-20

## 2025-01-29 PROBLEM — G43.109 MIGRAINE WITH AURA AND WITHOUT STATUS MIGRAINOSUS, NOT INTRACTABLE: Chronic | Status: ACTIVE | Noted: 2023-05-31

## 2025-01-29 PROCEDURE — 99214 OFFICE O/P EST MOD 30 MIN: CPT | Mod: PBBFAC | Performed by: ANESTHESIOLOGY

## 2025-01-29 PROCEDURE — 99999 PR PBB SHADOW E&M-EST. PATIENT-LVL IV: CPT | Mod: PBBFAC,,, | Performed by: ANESTHESIOLOGY

## 2025-01-29 PROCEDURE — G2211 COMPLEX E/M VISIT ADD ON: HCPCS | Mod: S$PBB,,, | Performed by: ANESTHESIOLOGY

## 2025-01-29 PROCEDURE — 99203 OFFICE O/P NEW LOW 30 MIN: CPT | Mod: S$PBB,,, | Performed by: ANESTHESIOLOGY

## 2025-01-29 RX ORDER — PREGABALIN 50 MG/1
50 CAPSULE ORAL 2 TIMES DAILY
Qty: 60 CAPSULE | Refills: 2 | Status: SHIPPED | OUTPATIENT
Start: 2025-01-29

## 2025-01-29 NOTE — PROGRESS NOTES
New Patient Interventional Pain Note (Initial Visit)    Referring Physician: Inge Gómez MD    PCP: Yessica, Primary Doctor    Chief Complaint:     Chief Complaint   Patient presents with    Ankle Pain        SUBJECTIVE:    Paulino Marcial is a 49 y.o. male who presents to the clinic for the evaluation of right pain.   Patient reports 4 month history of right ankle and right foot pain.  Patient reports a series of minor injuries to his right ankle throughout his life between playing sports and serving in the .  Patient reports that inciting injury start when he rolled his ankle 4 months ago.  Patient denies any previous procedures on his right ankle.  Patient has had previous right hip arthroplasty  Pain described as stabbing aching pain over the medial and lateral malleolus as well as the plantar aspect of the foot primarily over the he will.  Patient reports this pain will radiate up the posterior aspect of his right lower extremity to the mid calf.  Patient denies any radiation extending above his right knee.  Patient denies any radiation to his toes.  Patient denies any significant left foot pain.  Patient does not recall any numbness or tingling with the pain.  Pain is worse with bearing weight to his right foot, better with sitting down and elevation in his right lower extremity.  Pain is currently rated a 4/10, but can increase to a 9/10 with exacerbating activity.  Patient denies any fevers, chills, saddle anesthesia, or bowel and bladder incontinence.      Non-Pharmacologic Treatments:  Physical Therapy/Home Exercise: yes  Ice/Heat:yes  TENS: no  Acupuncture: no  Massage: no  Chiropractic: no        Previous Pain Medications:  Tylenol, ibuprofen, Flexeril, meloxicam, naproxen     report:  Reviewed and consistent with medication use as prescribed.    Pain Procedures:   none    Pain Disability Index Review:         1/29/2025    12:07 PM   Last 3 PDI Scores   Pain Disability Index (PDI) 56        Imaging:      MRI ANKLE WITHOUT CONTRAST RIGHT 12/06/2024     CLINICAL HISTORY: Right ankle pain     TECHNIQUE: Multiplanar multisequence imaging of the right ankle is performed without intravenous contrast.     FINDINGS:  There is a prominent anterior calcaneal process.  There is marked bone marrow edema in the anterior process of the calcaneus and sustentaculum alyson without fracture.  There is a small calcaneal cuboid joint effusion.  There is a synovial cyst arising from the dorsal margin of the talonavicular joint measuring 2.5 x 1.3 x 1.4 cm.  There is no fracture or avascular necrosis.  No tibiotalar joint effusion.     The plantar fascia is normal.  The Achilles tendon, peroneal tendons, and tendons the anterior and medial compartment are intact.  The high ankle ligaments, deltoid ligament, spring ligament, ATFL, posterior talofibular ligament, and calcaneofibular ligament are all intact.  The Lisfranc ligament is intact.        Impression:     1.  Bilateral marrow edema in the anterior process of the calcaneus and sustentaculum alyson without fracture.  This may be due to stress response.  2.  Small calcaneal cuboid joint effusion.  3.  Synovial cyst arising the dorsal margin of the talonavicular joint measuring 2.5 x 1.4 x 1.3 cm.  4.  Intact ligaments and tendons.    Past Medical History:   Diagnosis Date    Anxiety     Chronic post-traumatic stress disorder (PTSD)     GERD (gastroesophageal reflux disease)     HTN (hypertension)      Past Surgical History:   Procedure Laterality Date    APPENDECTOMY      HIP ARTHROPLASTY      KNEE ARTHROPLASTY       Social History     Socioeconomic History    Marital status:    Tobacco Use    Smoking status: Every Day     Types: Vaping with nicotine    Smokeless tobacco: Former   Substance and Sexual Activity    Alcohol use: Yes     Alcohol/week: 2.0 standard drinks of alcohol     Types: 2 Cans of beer per week     Comment: rarely    Drug use: Never     Family  History   Problem Relation Name Age of Onset    Thrombosis Neg Hx         Review of patient's allergies indicates:   Allergen Reactions    Medrol [methylprednisolone] Anxiety       Current Outpatient Medications   Medication Sig    dextroamphetamine-amphetamine 10 mg Tab Take 1 tablet by mouth 2 (two) times daily.    diclofenac sodium (VOLTAREN ARTHRITIS PAIN) 1 % Gel Use small amount to affected area four times daily as needed for pain    LIDOcaine (LIDODERM) 5 % Place 1 patch onto the skin once daily. 12 hours on/12 hours off    LORazepam (ATIVAN) 0.5 MG tablet Take 0.5 mg by mouth daily as needed.    meloxicam (MOBIC) 15 MG tablet Take 1 tablet daily with food as needed for pain    naproxen (NAPROSYN) 500 MG tablet Take 1 tablet (500 mg total) by mouth 2 (two) times daily. As needed with food.    omeprazole (PRILOSEC) 20 MG capsule Take 20 mg by mouth once daily.    promethazine (PHENERGAN) 25 MG tablet Take 1 tablet (25 mg total) by mouth every 6 (six) hours as needed (dizziness).    rizatriptan (MAXALT) 10 MG tablet Take 10 mg by mouth every 2 (two) hours as needed.    sertraline (ZOLOFT) 100 MG tablet Take 100 mg by mouth once daily.    silver sulfADIAZINE 1% (SILVADENE) 1 % cream Apply topically 2 (two) times daily.    testosterone cypionate (DEPOTESTOTERONE CYPIONATE) 200 mg/mL injection Inject 200 mg into the muscle every 14 (fourteen) days.    valsartan (DIOVAN) 40 MG tablet Take 40 mg by mouth every morning.    pregabalin (LYRICA) 50 MG capsule Take 1 capsule (50 mg total) by mouth 2 (two) times daily.     No current facility-administered medications for this visit.         ROS  Review of Systems   Constitutional:  Negative for activity change, appetite change and fever.   HENT:  Negative for facial swelling, rhinorrhea and sore throat.    Respiratory:  Negative for cough, chest tightness, shortness of breath, wheezing and stridor.    Cardiovascular:  Negative for chest pain and palpitations.  "  Gastrointestinal:  Negative for abdominal pain, blood in stool, constipation, diarrhea, nausea and vomiting.   Endocrine: Negative for polydipsia, polyphagia and polyuria.   Genitourinary:  Negative for dysuria, hematuria and urgency.   Musculoskeletal:  Positive for arthralgias, gait problem and myalgias. Negative for back pain, joint swelling, neck pain and neck stiffness.   Skin:  Negative for rash.   Allergic/Immunologic: Negative for food allergies.   Neurological:  Positive for weakness. Negative for dizziness, tremors, seizures, syncope, facial asymmetry, speech difficulty, light-headedness, numbness and headaches.   Psychiatric/Behavioral:  Negative for agitation, hallucinations, self-injury and suicidal ideas. The patient is not nervous/anxious and is not hyperactive.             OBJECTIVE:  /84   Pulse 88   Resp 17   Ht 5' 10" (1.778 m)   Wt 94.3 kg (207 lb 14.3 oz)   BMI 29.83 kg/m²         Physical Exam  Constitutional:       General: He is not in acute distress.     Appearance: Normal appearance. He is not ill-appearing.   HENT:      Head: Normocephalic and atraumatic.      Nose: No congestion or rhinorrhea.   Eyes:      Extraocular Movements: Extraocular movements intact.      Pupils: Pupils are equal, round, and reactive to light.   Cardiovascular:      Pulses: Normal pulses.   Pulmonary:      Effort: Pulmonary effort is normal.   Musculoskeletal:      Right foot: Decreased range of motion. Normal capillary refill. Swelling, tenderness and bony tenderness present. No crepitus. Normal pulse.      Left foot: Normal.      Comments: Tenderness to palpation over the lateral and medial malleolus as well as the Achilles tendon   Skin:     General: Skin is warm and dry.      Capillary Refill: Capillary refill takes less than 2 seconds.   Neurological:      General: No focal deficit present.      Mental Status: He is alert and oriented to person, place, and time.      Sensory: No sensory deficit. "   Psychiatric:         Mood and Affect: Mood normal.         Behavior: Behavior normal.         Thought Content: Thought content normal.               LABS:  Lab Results   Component Value Date    WBC 7.3 08/01/2024    HGB 15.9 08/01/2024    HCT 47.8 08/01/2024    MCV 87 05/09/2023     08/01/2024       CMP  Sodium   Date Value Ref Range Status   05/09/2023 141 136 - 145 mmol/L Final     Potassium   Date Value Ref Range Status   05/09/2023 4.2 3.5 - 5.1 mmol/L Final     Chloride   Date Value Ref Range Status   05/09/2023 102 95 - 110 mmol/L Final     CO2   Date Value Ref Range Status   05/09/2023 30 (H) 23 - 29 mmol/L Final     Glucose   Date Value Ref Range Status   05/09/2023 111 (H) 70 - 110 mg/dL Final     BUN   Date Value Ref Range Status   05/09/2023 15 2 - 20 mg/dL Final     Creatinine   Date Value Ref Range Status   05/09/2023 0.95 0.50 - 1.40 mg/dL Final     Calcium   Date Value Ref Range Status   05/09/2023 9.2 8.7 - 10.5 mg/dL Final     Total Protein   Date Value Ref Range Status   05/09/2023 7.8 6.0 - 8.4 g/dL Final     Albumin   Date Value Ref Range Status   05/09/2023 4.8 3.5 - 5.2 g/dL Final     Total Bilirubin   Date Value Ref Range Status   05/09/2023 0.5 0.1 - 1.0 mg/dL Final     Comment:     For infants and newborns, interpretation of results should be based  on gestational age, weight and in agreement with clinical  observations.    Premature Infant recommended reference ranges:  Up to 24 hours.............<8.0 mg/dL  Up to 48 hours............<12.0 mg/dL  3-5 days..................<15.0 mg/dL  6-29 days.................<15.0 mg/dL       Alkaline Phosphatase   Date Value Ref Range Status   05/09/2023 77 38 - 126 U/L Final     AST   Date Value Ref Range Status   05/09/2023 44 15 - 46 U/L Final     ALT   Date Value Ref Range Status   05/09/2023 96 (H) 10 - 44 U/L Final     Anion Gap   Date Value Ref Range Status   05/09/2023 9 8 - 16 mmol/L Final       Lab Results   Component Value Date     HGBA1C 5.7 (H) 08/01/2024             ASSESSMENT:       49 y.o. year old male with right foot pain, consistent with     1. Calcaneal bursitis, unspecified laterality  pregabalin (LYRICA) 50 MG capsule      2. Right ankle pain, unspecified chronicity  Ambulatory referral/consult to Pain Clinic    pregabalin (LYRICA) 50 MG capsule      3. Sprain of anterior talofibular ligament of right ankle, sequela  pregabalin (LYRICA) 50 MG capsule        Calcaneal bursitis, unspecified laterality  -     pregabalin (LYRICA) 50 MG capsule; Take 1 capsule (50 mg total) by mouth 2 (two) times daily.  Dispense: 60 capsule; Refill: 2    Right ankle pain, unspecified chronicity  -     Ambulatory referral/consult to Pain Clinic  -     pregabalin (LYRICA) 50 MG capsule; Take 1 capsule (50 mg total) by mouth 2 (two) times daily.  Dispense: 60 capsule; Refill: 2    Sprain of anterior talofibular ligament of right ankle, sequela  -     pregabalin (LYRICA) 50 MG capsule; Take 1 capsule (50 mg total) by mouth 2 (two) times daily.  Dispense: 60 capsule; Refill: 2             PLAN:   - Interventions:   - none at this time.  Patient is scheduled for updated MRI of ankle and follow up with Orthopedics to monitor previous edema seen over the calcaneus.  If pain continues in no surgical intervention is deemed necessary at this time, can consider lumbar sympathetic block versus peripheral nerve stimulation    - Anticoagulation use:   No no anticoagulation    - Medications:  - start pregabalin 50 mg twice a day  - start compound cream as patient reports improvement with Lidoderm patches.  Patient will continue Lidoderm patches until compound cream was delivered to his house  - continue naproxen 500 mg twice a day    - Therapy:   - patient has completed 8 weeks of formal physical therapy with the last 3 months with continued pain.  Continue home exercises   - continue footwear recommendations from orthopedics.  Significant for bilateral marrow edema  over the anterior process of the calcaneus without fracture    - Imaging/Diagnostic:  - MRI of right foot reviewed and findings discussed with patient. Significant for bilateral marrow edema over the anterior process of the calcaneus without fracture.  Patient has a updated MRI scheduled in one-week    - Consults:   Continue follow up with Orthopedics.  Patient is scheduled for repeat MRI and follow up      - Patient Questions: Answered all of the patient's questions regarding diagnosis, therapy, and treatment     This condition does not require this patient to take time off of work, and the primary goal of our Pain Management services is to improve the patient's functional capacity.     - Follow up visit: return to clinic in 3-4 weeks    Visit today included increased complexity associated with the care of the episodic problem of chronic pain which was addressed and continue to manage the longitudinal care of the patient due to the serious and/or complex managed problem(s) listed above.      The above plan and management options were discussed at length with patient. Patient is in agreement with the above and verbalized understanding.    I discussed the goals of interventional chronic pain management with the patient on today's visit.  I explained the utility of injections for diagnostic and therapeutic purposes.  We discussed a multimodal approach to pain including treating the patient's given worst pain at any given time.  We will use a systematic approach to addressing pain.  We will also adopt a multimodal approach that includes injections, adjuvant medications, physical therapy, at times psychiatry.  There may be a limited role for opioid use intermittently in the treatment of pain, more particularly for acute pain although no one approach can be used as a sole treatment modality.    I emphasized the importance of regular exercise, core strengthening and stretching, diet and weight loss as a cornerstone of  long-term pain management.      Mauricio Emery MD  Interventional Pain Management  [facility]    Future Appointments   Date Time Provider Department Center   1/30/2025  3:00 PM [facility] MRI1 [facility] MRI [facility]   2/3/2025  2:20 PM Inge Gómez MD [facility] ORTHO [facility]   3/6/2025  8:00 AM Mauricio Emery MD [facility] INT [facility]   3/14/2025  7:30 AM Inge Gómez MD [facility] ORTHO [facility]           Disclaimer:  This note was prepared using voice recognition system and is likely to have sound alike errors that may have been overlooked even after proof reading.  Please call me with any questions      I spent a total of 30 minutes on the day of the visit.  This includes face to face time and non-face to face time preparing to see the patient (eg, review of tests), obtaining and/or reviewing separately obtained history, documenting clinical information in the electronic or other health record, independently interpreting results and communicating results to the patient/family/caregiver, or care coordinator.

## 2025-01-30 ENCOUNTER — HOSPITAL ENCOUNTER (OUTPATIENT)
Dept: RADIOLOGY | Facility: HOSPITAL | Age: 50
Discharge: HOME | End: 2025-01-30
Attending: ORTHOPAEDIC SURGERY
Payer: OTHER GOVERNMENT

## 2025-01-30 DIAGNOSIS — M25.579 PAIN IN UNSPECIFIED ANKLE AND JOINTS OF UNSPECIFIED FOOT: ICD-10-CM

## 2025-01-30 PROCEDURE — 73721 MRI JNT OF LWR EXTRE W/O DYE: CPT | Mod: 26,RT,, | Performed by: RADIOLOGY

## 2025-01-30 PROCEDURE — 73721 MRI JNT OF LWR EXTRE W/O DYE: CPT | Mod: TC,PN,RT

## 2025-02-07 NOTE — PROGRESS NOTES
[address] [facility] [address], [place] [address]   Phone [phone]  Fax [phone]           CHIEF COMPLAINT: Pain of the Right Ankle  HISTORY OF PRESENT ILLNESS JN (02/10/2025):    Paulino presents for  MRI review andfollow-up regarding ongoing foot pain. An MRI shows worsening bone bruising in the talus and navicular bones compared to a previous scan. Pain has been severe. Paulino reports improvement since using crutches and avoiding weight-bearing for about a week. His free testosterone was slightly low about six months ago but within normal range.    Recently, patient had pneumonia, describing it as extremely painful. Paulino states it started as flu and then aspiration occurred. Paulino was incapacitated for approximately six days.    Paulino has been prescribed lidocaine patches and Lyrica, which was intended to replace gabapentin and cause less drowsiness. However, patient reports only taking Lyrica once at night instead of the prescribed twice daily due to sleepiness. Paulino expresses concern about the possibility of a fracture and mentions having PTSD, stating that idleness exacerbates PTSD symptoms. Paulino has already contacted psychologist regarding this concern.    Paulino denies being super sensitive to touch. Paulino denies having complex regional pain syndrome.    PREVIOUS TREATMENTS:  - Lidocaine patches: Currently using  - Lyrica: Taking once at night instead of prescribed twice daily, due to sleepiness  - Gabapentin: Previously taken, caused sleepiness  - Crutches: Used for about a week to avoid weight-bearing on the affected foot    MEDICATIONS:  - Lyrica: Once at night, sleepiness  - Lidocaine patches          HISTORY OF PRESENT ILLNESS JN (02/10/2025):    Paulino presents with right foot pain and swelling from an injury in October. Pain has been worsening, described as severe enough to wake him from sleep. He reports taking up to 3200 mg of ibuprofen daily for pain  management, stating that he cannot walk without medication due to the severe pain. Ice therapy 3-4 times daily and a TENS machine 1-2 times daily provide minimal relief. He also uses Voltaren gel, an anti-inflammatory topical treatment which he reports also does not work    An MRI two months ago revealed bone bruising, soft tissue swelling, and cysts in the foot. Paulino has been taking high doses of Vitamin D (up to 20,000 IUs daily) and K2 supplements. He sometimes experiences itching in the affected area, which he attributes to the swelling.    He expresses frustration with the slow healing process and the impact on his daily life, particularly his ability to work. He states that this injury has been more challenging to manage than previous significant injuries. His medical history includes a C5 fracture, major hip surgery, and knee surgery.    Paulino denies sheets or light touch bothering him at night.  The sheets on his bed do not bother him    PREVIOUS TREATMENTS:  - Ice therapy: 3-4 times daily, minimal relief  - TENS machine: 1-2 times daily, minimal relief  - Voltaren gel: Anti-inflammatory topical treatment    MEDICATIONS:  - Ibuprofen: 3200 mg daily since October  - Omeprazole: daily  - Vitamin D: 20,000 IU daily with K2  - Naproxen: alternative to ibuprofen    SURGICAL HISTORY:  - Hip surgery  - Knee surgery  - C5 fracture            HISTORY OF PRESENT ILLNESS (01/06/2025):  Paulino presents with ongoing foot and ankle issues, experiencing swelling in his ankle that has been moving to different areas. He reports tingling on the bottom of his foot and side of his toes. Paulino notes significant difficulty walking two days ago. The swelling is primarily in the lateral ankle area.    He mentions having seven anchors in his hip, which causes discomfort. He tried wearing a boot previously, which helped initially, but stopped using it when he returned to work. He explains that he is a  and finds it  difficult to be inactive.    Paulino reports taking vitamin D3 K2 supplements, about 10,000 IU a day. 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    He mentions having rolled his ankle in October, which may be the cause of his current issues. He expresses concern about the possibility of cancer.    Paulino reports experiencing pain in the underneath part of the ankle and heel, which he associates with bone bruising. He mentions having had this before but notes that the current issues are more in the upper part of his foot and ankle. Paulino also mentions having a cyst in his ankle.    Paulino's medical history includes right knee surgery, seven anchors in his hip, and shoulder surgery for an impingement where part of his clavicle was removed. He attributes some of these issues to his  service, particularly carrying gear on his right side.    Paulino denies pain over a specific area when asked about his hip. Paulino denies any medical history of cancer.    MEDICATIONS:  - Vitamin D3 K2: 10,000 IU daily    SURGICAL HISTORY:  - Right knee surgery  - Hip surgery: seven anchors placed  - Shoulder surgery: part of the clavicle bone (about an inch) removed due to an impingement          HISTORY OF PRESENT ILLNESS (12/10/2024):  Paulino presents FOR AN MRI REVIEW with ankle pain that started about a month and a half ago in October after rolling his ankle, which felt different from previous ankle rolls. He states that this time it felt different. The pain is significantly better than it was, but he believes he's starting to compensate, causing pain underneath the ankle. He reports some improvement with ibuprofen use but expresses frustration with his limited ability to engage in activities.    He mentions feeling occasional pressure and pain on the top of his foot, which he believes might be  "related to a cyst identified on the MRI. He has a history of low vitamin D levels and is concerned about his ability to lift weights and engage in other activities. He describes wearing various types of shoes, including Nikes, Jordans, [employer] Ones, and Vans, and mentions using Superfeet inserts.    The prescribed meloxicam is not effective for pain relief, with the patient stating its efficacy diminishes about penitentiary through the day. He denies any concerns for cancer, medical history of diabetes, hypertension, or other chronic conditions.    MEDICATIONS:  - Meloxicam: Not effective, especially by penitentiary through the day  - Vitamin D: History of low levels  - Ibuprofen: Provides much more relief          HISTORY OF PRESENT ILLNESS (11/22/2024):  HPI:  Paulino presents for follow-up regarding an ankle injury. He reports improvement, stating that it's significantly better than before and he can now walk without much of a limp. He has been wearing a boot, which he believes has helped. He is currently taking meloxicam for pain management.    He reports ongoing pain in a specific area of the ankle, with a particular incident of pain waking him up the previous night. He describes the pain as feeling like "a pair of needles." Paulino typically experiences pain when flexing his foot, and by the end of the day as the meloxicam starts to wear off. He's experiencing some pain, but it's significantly less than when he first started treatment.    The initial injury occurred when he turned his ankle at home, but he mentions experiencing similar incidents in the past without significant issues. He expresses concern about the duration of this particular episode, stating that it has taken longer to heal than any previous incidents.    Paulino denies significant pain when moving his foot outward or holding it in that position. Paulino denies any visible bruising from the initial injury.            HISTORY OF PRESENT ILLNESS " (11/15/2024):    Paulino presents with right lateral hindfoot pain pain that has persisted for at least three weeks and has progressively worsened. He localizes the pain to the lateral aspect of his foot. He recalls rolling his ankle at home but cannot pinpoint a specific injury as the cause. He reports playing basketball and having a long  career, noting it's not uncommon for him to experience foot pain, but states this episode is unusually prolonged.    He describes swelling in his ankle, which he can feel near the Achilles tendon. He mentions taking a significant amount of ibuprofen to manage daily activities, which is atypical for him. He reports feeling pressure throughout his foot with movement, describing it as a sensation of swelling without acute pain.    His activity level has decreased due to the foot pain.  He has never had gout. He cannot take steroid Dosepaks due to issues with his blood pressure.    He denies recalling a specific injury to his foot. He denies having diabetes or a history of gout.    He has taken ibuprofen for pain management.     SURGICAL HISTORY:  - Hip surgery: resulted in cessation of running  - Left knee meniscus repair: Approximately 3 months after hip surgery  - Shoulder surgery: Last year    SOCIAL HISTORY:  - Long  career  - Right-sided body issues due to  service  - Previously ran 40-50 miles per week          PAST MEDICAL HISTORY:    Past Medical History:   Diagnosis Date    Anxiety     Chronic post-traumatic stress disorder (PTSD)     GERD (gastroesophageal reflux disease)     HTN (hypertension)        Hemoglobin A1C   Date Value Ref Range Status   08/01/2024 5.7 (H) 4.8 - 5.6 % Final     Comment:              Prediabetes: 5.7 - 6.4           Diabetes: >6.4           Glycemic control for adults with diabetes: <7.0        PAST SURGICAL HISTORY:    Past Surgical History:   Procedure Laterality Date    APPENDECTOMY      HIP ARTHROPLASTY      KNEE  ARTHROPLASTY          MEDICATIONS:    Current Outpatient Medications:     dextroamphetamine-amphetamine 10 mg Tab, Take 1 tablet by mouth 2 (two) times daily., Disp: , Rfl:     LIDOcaine (LIDODERM) 5 %, Place 1 patch onto the skin once daily. 12 hours on/12 hours off, Disp: 15 patch, Rfl: 1    LORazepam (ATIVAN) 0.5 MG tablet, Take 0.5 mg by mouth daily as needed., Disp: , Rfl:     naproxen (NAPROSYN) 500 MG tablet, Take 1 tablet (500 mg total) by mouth 2 (two) times daily. As needed with food., Disp: 60 tablet, Rfl: 0    omeprazole (PRILOSEC) 20 MG capsule, Take 20 mg by mouth once daily., Disp: , Rfl:     pregabalin (LYRICA) 50 MG capsule, Take 1 capsule (50 mg total) by mouth 2 (two) times daily., Disp: 60 capsule, Rfl: 2    promethazine (PHENERGAN) 25 MG tablet, Take 1 tablet (25 mg total) by mouth every 6 (six) hours as needed (dizziness)., Disp: 20 tablet, Rfl: 0    rizatriptan (MAXALT) 10 MG tablet, Take 10 mg by mouth every 2 (two) hours as needed., Disp: , Rfl:     sertraline (ZOLOFT) 100 MG tablet, Take 100 mg by mouth once daily., Disp: , Rfl:     silver sulfADIAZINE 1% (SILVADENE) 1 % cream, Apply topically 2 (two) times daily., Disp: 25 g, Rfl: 0    testosterone cypionate (DEPOTESTOTERONE CYPIONATE) 200 mg/mL injection, Inject 200 mg into the muscle every 14 (fourteen) days., Disp: , Rfl:     valsartan (DIOVAN) 40 MG tablet, Take 40 mg by mouth every morning., Disp: , Rfl:      There are no discontinued medications.      ALLERGIES:     Review of patient's allergies indicates:   Allergen Reactions    Medrol [methylprednisolone] Anxiety            FAMILY HISTORY:   Family History   Problem Relation Name Age of Onset    Thrombosis Neg Hx             SOCIAL HISTORY:    Social History     Socioeconomic History    Marital status:    Tobacco Use    Smoking status: Every Day     Types: Vaping with nicotine    Smokeless tobacco: Former   Substance and Sexual Activity    Alcohol use: Yes     Alcohol/week:  "2.0 standard drinks of alcohol     Types: 2 Cans of beer per week     Comment: rarely    Drug use: Never         PHYSICAL EXAMINATION:     Estimated body mass index is 29.83 kg/m² as calculated from the following:    Height as of 1/29/25: 5' 10" (1.778 m).    Weight as of this encounter: 94.3 kg (207 lb 14.3 oz).   ASSISTIVE DEVICE:  Boot  plus single crutch    Ankle Exam (affected extremity):   Inspection:         Gross deformity   (-)         Erythema   (-)        Ecchymosis   (-)          Swelling   (+) diffusely over hindfoot and midfoot but moderate only in the lateral aspect of the ankle           Open wounds   (-)         Surgical scars   (-)                    Palpation tenderness:  Bony:  Hindfoot:  Proximal fibula  (-)  Calcaneus  (+)   Distal fibula  (-)  Talus   (+)   Medial malleolus  (-)  CC joint/cuboid  (+)   Tibiotalar joint  (-)  Lateral gutter  (-)  Plantar fascia  (-)  Medial gutter  (-)   Midfoot:   TN joint   (-)   NC joints   (-)   TMT joints   (-)   Forefoot:   (-)      Soft tissue:     Tendons:    Achilles midsubstance (-)  Peroneal tendons  (+)   Achilles insertion  (-)  Posterior tibial tendon (-)   Retrocalcaneal bursa (-)  Anterior tibial tendon  (-)   Ligaments:   ATFL   (+)  Deltoid   (-)   CFL   (+)  Syndesmosis  (-)  ROM:  Affected Ankle:         DF:    10°        PF:    40°                 Pain    (+)       Crepitance   (-)       Sensory:  Normal sensation to light touch in Sa/Cordon/DP/SP/T nerve distributions      Motor:               Fires EHL/FHL/Tibialis anterior/Gastrocsoleus   Vascular:  Foot WWP with brisk capillary refill    DP/PT pulses palpable  Special Tests:  Montes   (-)  Anterior drawer   (-)  Calcaneal squeeze  (+)  Syndesmotic squeeze  (-)        IMAGING:      MRI Ankle Without Contrast Right  Narrative: EXAM:  MRI ANKLE WITHOUT CONTRAST RIGHT    CLINICAL HISTORY: Right ankle pain.  [M25.763]-Pain in unspecified ankle and joints of unspecified foot.    TECHNIQUE: " Standard multiplanar, multisequence MR imaging protocol of the right ankle was performed.    COMPARISON: 12/06/2024    FINDINGS:  BONES & JOINTS: Worsening prominent marrow edema of the anterior calcaneal process, talar neck, navicular and to a lesser degree of the cuboid, middle and lateral cuneiforms. Surrounding regional soft tissue edema.  Possible small nondisplaced stress fracture of the plantar talar neck (sagittal image 14 series 6).  No other evidence of fracture. Normal joint alignment.  No osteochondral defects.  Joint spaces appear relatively well-preserved.  Stable small ganglion cyst along the dorsal medial talar neck.    LIGAMENTS:  Anterior inferior tibiofibular ligament: Intact  Posterior inferior tibiofibular ligament: Intact  Anterior talofibular ligament: Intact  Calcaneofibular ligament: Intact  Posterior talofibular ligament: Intact  Deltoid ligament complex: Intact  Spring ligament: Intact    TENDONS:  Peroneus longus tendon: Unremarkable  Peroneus brevis tendon: Unremarkable  Posterior tibial tendon: Unremarkable  Flexor digitorum longus tendon: Unremarkable  Flexor hallucis longus: Mild tenosynovitis.  Otherwise unremarkable.  Achilles tendon: Unremarkable  Extensor tendons: Unremarkable    OTHER STRUCTURES:  Plantar fascia: Unremarkable  Muscles: Unremarkable  Tarsal tunnel: Unremarkable  Sinus tarsi: Unremarkable  Impression: Worsening marrow edema of the talus, calcaneus, navicular, cuboid and cuneiforms with a possible stress fracture of the talar neck.  Surrounding regional soft tissue edema.  Mild FHL tenosynovitis.  Stable small ganglion cyst along the dorsal medial talar neck. No other significant abnormality identified.    Finalized on: 1/30/2025 3:08 PM By:  Kolby Montana MD  [facility]# [id number]      2025-01-30 15:[id number]     [facility]           ASSESSMENT: 49 y.o. male  with:   Right diffuse hindfoot swelling with majority of pain centered over peroneals present since October 20, 2024  after a trauma with MRI on 12/06/2024 demonstrating:  Bilateral marrow edema in the anterior process of the calcaneus and sustentaculum alyson without fracture.  This may be due to stress response., Small calcaneal cuboid joint effusion, Synovial cyst arising the dorsal margin of the talonavicular joint measuring 2.5 x 1.4 x 1.3 cm,  Intact ligaments and tendons.  Can not take steroids due elevations blood pressure caused by steroids  Normal vitamin-D  Negative rheumatoid factor and GIOVANNI   Repeat MRI 01/30/2025 demonstrating Worsening marrow edema of the talus, calcaneus, navicular, cuboid and cuneiforms with a possible stress fracture of the talar neck.  Surrounding regional soft tissue edema.  Mild FHL tenosynovitis.  Stable small ganglion cyst along the dorsal medial talar neck. No other significant abnormality identified.    PLAN:  Data:  I independently visualized xray images today  I reviewed available old/outside records  PT/OT:   deferred  Medications:   Per pain management   previously prescribed naproxen, twice daily medication, as an alternative to meloxicam which has not been working well for him.  He is also taking too many anti-inflammatories.  He needs to slow down on taking anti-inflammatories  He cannot take steroids  continue taking vitamin D supplements to speed up bone healing.  However currently is taking too much vitamin-D  Given lidocaine patches  at his last visit  DME and weightbearing status:   Boot and non weightbearing x6 weeks   Compression socks hurt him worse   He has received a [facility] prescription in the past   Given info on how to get a an I walker  Advanced imaging:   We reviewed his MRI today  Referral:   To endocrinology for bone metabolism workup   He sees Dr. Mauricio Emery  for pain management regarding this condition.   Perhaps this is a sign of CRPS although he does not have sensitivity to light touch today   To [employer] for handicap tag  Work/school/disability note/status:   No  work until cleared by me  Education:  I had a long discussion with the patient about the causes, treatments, and prognosis/natural history for  bony edema.  We discussed effective ways to improve symptoms as well as what types of activities may make the symptoms/prognosis worse. We discussed signs and symptoms and other reasons to return to clinic sooner.  Return to clinic:   4-6 weeks  Images needed at next visit:  none    This note was generated with the assistance of ambient listening technology. Verbal consent was obtained by the patient and accompanying visitor(s) for the recording of patient appointment to facilitate this note. I attest to having reviewed and edited the generated note for accuracy, though some syntax or spelling errors may persist. Please contact the author of this note for any clarification.              Physician Signature: Inge Gómez M.D.       Official Website  Schedule An Appointment

## 2025-02-10 ENCOUNTER — OFFICE VISIT (OUTPATIENT)
Dept: ORTHOPEDICS | Facility: CLINIC | Age: 50
End: 2025-02-10
Payer: OTHER GOVERNMENT

## 2025-02-10 VITALS — WEIGHT: 207.88 LBS | BODY MASS INDEX: 29.83 KG/M2

## 2025-02-10 DIAGNOSIS — M25.571 RIGHT ANKLE PAIN, UNSPECIFIED CHRONICITY: Primary | ICD-10-CM

## 2025-02-10 DIAGNOSIS — R93.7 BONE MARROW EDEMA: ICD-10-CM

## 2025-02-10 PROCEDURE — 99215 OFFICE O/P EST HI 40 MIN: CPT | Mod: S$PBB,,, | Performed by: ORTHOPAEDIC SURGERY

## 2025-02-10 PROCEDURE — 99999 PR PBB SHADOW E&M-EST. PATIENT-LVL III: CPT | Mod: PBBFAC,,, | Performed by: ORTHOPAEDIC SURGERY

## 2025-02-10 PROCEDURE — 99213 OFFICE O/P EST LOW 20 MIN: CPT | Mod: PBBFAC | Performed by: ORTHOPAEDIC SURGERY

## 2025-02-10 NOTE — LETTER
I certify that (Name) Paulino Marcial meets the requirements as outlined in #  6 (shown on reverse side) and qualifies for a mobility impaired license plate/hang-tag. I further understand that willful and false certification shall subject me to fines/imprisonment as outlined in R.S. 47:463.4 (G) (4). The applicant's information is as follows:    YOB: 1975            Race:White        Gender:Male    Address:  [address] [place] [address]    City:[place]                               State:[place]     Zip Code:[address]     []Permanently Impaired - Applicant has a total or lifelong condition of mobility impairment from which little or no improvement or recovery can reasonably be expected. A medical examiners certification is required on initial application only.      [x] Temporarily Impaired - Applicant has a temporary condition of mobility impairment of which improvement or recovery can reasonably be expected. Applicant is entitled to a hangtag, which will be valid for one (1) year. A medical examiners certification is required for the renewal of the hangtag      [] Unable to appear in person at the Office of Motor Vehicles - Applicant must bring facial photo        Medical Examiner's Signature________________________________________ Date:2/10/25_________________________    Printed Name:_________Inge Gómez MD    State License #_____________________________    Address: [address] [facility]___                                     Phone Number: [phone]                                                                                                                                                                                                                  City: [place]__________________________________ State: [place] __Zip Code: [address] _______________    TO BE COMPLETED BY MOTOR VEHICLE ANALYST ONLY    GEORGETTE   Lic. Plate #      Hangtag Control #   Hangtag ID #      Date Issued:    #:    Office #:

## 2025-02-12 ENCOUNTER — PATIENT MESSAGE (OUTPATIENT)
Dept: PAIN MEDICINE | Facility: CLINIC | Age: 50
End: 2025-02-12
Payer: OTHER GOVERNMENT

## 2025-02-20 ENCOUNTER — TELEPHONE (OUTPATIENT)
Dept: ENDOCRINOLOGY | Facility: CLINIC | Age: 50
End: 2025-02-20
Payer: OTHER GOVERNMENT

## 2025-02-20 ENCOUNTER — TELEPHONE (OUTPATIENT)
Dept: ORTHOPEDICS | Facility: CLINIC | Age: 50
End: 2025-02-20
Payer: OTHER GOVERNMENT

## 2025-02-20 ENCOUNTER — OFFICE VISIT (OUTPATIENT)
Facility: CLINIC | Age: 50
End: 2025-02-20
Payer: OTHER GOVERNMENT

## 2025-02-20 DIAGNOSIS — E29.1 HYPOGONADISM IN MALE: Primary | ICD-10-CM

## 2025-02-20 DIAGNOSIS — M84.372S: ICD-10-CM

## 2025-02-20 DIAGNOSIS — G47.33 OSA ON CPAP: Chronic | ICD-10-CM

## 2025-02-20 NOTE — Clinical Note
Please arrange an 8 am fasting lab draw for this patient tomorrow or soon.  Bone density scan needed soon as well.    Follow up in 1 year for now  Thanks

## 2025-02-20 NOTE — PROGRESS NOTES
ENDOCRINOLOGY NEW PATIENT VISIT: 02/20/2025    The patient location is: Home  The chief complaint leading to consultation is: Bone Concerns    Visit type: audiovisual    Face to Face time with patient: 45 minutes  50 minutes of total time spent on the encounter, which includes face to face time and non-face to face time preparing to see the patient (eg, review of tests), Obtaining and/or reviewing separately obtained history, Documenting clinical information in the electronic or other health record, Independently interpreting results (not separately reported) and communicating results to the patient/family/caregiver, or Care coordination (not separately reported).     Each patient to whom he or she provides medical services by telemedicine is:  (1) informed of the relationship between the physician and patient and the respective role of any other health care provider with respect to management of the patient; and (2) notified that he or she may decline to receive medical services by telemedicine and may withdraw from such care at any time.    Subjective:      Patient ID: Paulino Marcial is a 49 y.o. male.    Chief Complaint:  Consult    History of Present Illness  Paulino Marcial presents for evaluation of bone concerns related to a right ankle issue.  He had 3 minor twisting events of his right ankle over a few months (first injury around Sept 2024).  He ended up getting multiple MRI studies of the ankle with orthopedic surgery.  The MRI showed concerns for bone marrow edema in the foot.  The second MRI showed worsening edema with possible stress fracture in the talus.  See below.  Now being referred to endocrine for evaluation for metabolic bone disease issues that could be a factor.      MRI Right Ankle:  12/06/2024    Impression:  1.  Bilateral marrow edema in the anterior process of the calcaneus and sustentaculum alyson without fracture.  This may be due to stress response.  2.  Small calcaneal cuboid joint  effusion.  3.  Synovial cyst arising the dorsal margin of the talonavicular joint measuring 2.5 x 1.4 x 1.3 cm.  4.  Intact ligaments and tendons.    MRI Right Ankle:  12/06/2024    Impression:  Worsening marrow edema of the talus, calcaneus, navicular, cuboid and cuneiforms with a possible stress fracture of the talar neck.  Surrounding regional soft tissue edema.  Mild FHL tenosynovitis.  Stable small ganglion cyst along the dorsal medial talar neck. No other significant abnormality identified.      Mr. Marcial has history of multiple surgeries in the past including right shoulder, right hip and right knee meniscus repair.  He is told that the ankle issue he has now is not a surgical condition.  He was in the  for 21 years.  No fragility fractures from prior that he recalls.  With the injuries to his ankle he was on higher dose ibuprofen for symptoms control but now established with pain management.  He does not that in the past testosterone levels were found to be low and he was placed on replacement TRT with his PCP but had issues with aggression so this was stopped.  Dosing was initially 200 mg every other week around a year ago.  He did note some positive side effects while on the therapy with energy and such.  He notes that he has worked on weight loss over the past 6 months with dietary adjustment and has lost about 45-50 lbs.  He has been on Vit D supplement in recent months as well.          Regarding Bone Issues:    - Most recent DEXA: No prior DXA      Lab Results   Component Value Date    CALCIUM 9.2 05/09/2023    ALKPHOS 77 05/09/2023    TSH 1.79 08/01/2024 August 2024 Labs:  In Care Everywhere      Normal cortisol without elevations      - Fracture history:  No fragility fracture history    - Past osteoporosis medication:  None prior    - Calcium intake:  No calcium pills.  Eats fair amount of dairy products     - Vit D3 intake:  Vit D3 10,000 IU per day with K2 (has been on this for about 4  "months)  No 25 Vit D levels, only 1,25 checked.      - Pertinent factors:  No   Yes  []    [x]  Tobacco use (vapes for now - no cigarettes)  []    [x]  Alcohol use (now 1-2 drinks a week, used to be 2-3 a day)  [x]    []  Current diarrhea or history of malabsorption (Celiac disease)  [x]    []  Thyroid disease  [x]    []  Anemia  []    [x]  Kidney stones (single kidney stone years prior)  [x]    []  Hyperparathyroidism  [x]    []  High risk medications include: none  (prior use of PPI for 10+ years, quit recently)  [x]    []  Recent falls  [x]    []  Height loss greater than 2 inches    Regarding Testosterone levels:    Low on labs from around August but had been evaluate in 2023 and was low then as well.  Placed on TRT over a year ago but had issues with anger so stopped.  Off this for multiple months now.    No prior LH/FSH or T panels checked      ROS:   As above    Objective:     There were no vitals taken for this visit.  BP Readings from Last 3 Encounters:   01/29/25 124/84   01/01/24 (!) 141/90   10/18/23 (!) 144/88     Wt Readings from Last 1 Encounters:   02/10/25 1419 94.3 kg (207 lb 14.3 oz)     There is no height or weight on file to calculate BMI.    Ht:  5'10"  Wt:  200  BMI:  28.7    Physical Exam  Constitutional:       Appearance: Normal appearance.   Neurological:      Mental Status: He is alert.   Psychiatric:         Mood and Affect: Mood normal.         Behavior: Behavior normal.        Lab Review:   Lab Results   Component Value Date    HGBA1C 5.7 (H) 08/01/2024     Lab Results   Component Value Date    CHOL 156 08/01/2024    HDL 34 (L) 08/01/2024    LDLCALC 100 (H) 08/01/2024    TRIG 118 08/01/2024     Lab Results   Component Value Date     05/09/2023    K 4.2 05/09/2023     05/09/2023    CO2 30 (H) 05/09/2023     (H) 05/09/2023    BUN 15 05/09/2023    CREATININE 0.95 05/09/2023    CALCIUM 9.2 05/09/2023    PROT 7.8 05/09/2023    ALBUMIN 4.8 05/09/2023    BILITOT 0.5 " "05/09/2023    ALKPHOS 77 05/09/2023    AST 44 05/09/2023    ALT 96 (H) 05/09/2023    ANIONGAP 9 05/09/2023    TSH 1.79 08/01/2024     No results found for: "[id number]"    Assessment and Plan     Hypogonadism in male  Reviewed of prior labs have showed a low normal total T level and also low free T level.  Labs prior to this not available for review but mentioned Total T levels under 200.  This could be a contributor to bone health.    Plan:  Order morning fasting labs: Testosterone panel (Total T, Free T, Bioavailable T, SHBG), LH, FSH.  Evaluate results to determine need for further endocrine workup or referral to urology for TRT (requires in-person visit).  Continue current Vitamin D supplementation; adjust if necessary based on lab results.  Follow up after lab review to determine next steps.    Possible stress fracture of left ankle  MRI imaging showing possible talar neck stress fracture.  Continued daryl marrow edema noted.  Following with orthopedic surgery.  Now needing workup for metabolic bone issues with these findings.      Plan:  Order morning fasting labs: CMP, Magnesium, Phosphorus, Vitamin D (25-hydroxy), PTH.  Arrange for a bone density scan to evaluate for osteoporosis.  Follow with any activity modification that orthopedics may suggest to reduce further stress on the ankle.  Follow up with lab and imaging results to guide further management.    CLAUDIA on CPAP  Poorly treated CLAUDIA could promote low testosterone levels so close follow up overtime is suggested if sleeping issues occur despite use of CPAP.        RTC in 1 year for now.  Labs and DXA soon.        **Visit today included increased complexity associated with the care of the problems addressed and managing the longitudinal care of the patient due to the serious and/or complex managed problems      Toby Mendoza DO     Disclaimer: This note has been generated using voice-recognition software. There may be typographical errors that have " been missed during proof-reading.

## 2025-02-21 ENCOUNTER — LAB VISIT (OUTPATIENT)
Dept: LAB | Facility: HOSPITAL | Age: 50
End: 2025-02-21
Attending: STUDENT IN AN ORGANIZED HEALTH CARE EDUCATION/TRAINING PROGRAM
Payer: OTHER GOVERNMENT

## 2025-02-21 DIAGNOSIS — M84.372S: ICD-10-CM

## 2025-02-21 DIAGNOSIS — E29.1 HYPOGONADISM IN MALE: ICD-10-CM

## 2025-02-21 PROBLEM — M84.372A STRESS FRACTURE OF LEFT ANKLE: Status: ACTIVE | Noted: 2025-02-21

## 2025-02-21 LAB
25(OH)D3+25(OH)D2 SERPL-MCNC: 100 NG/ML (ref 30–96)
ALBUMIN SERPL BCP-MCNC: 4.1 G/DL (ref 3.5–5.2)
ALP SERPL-CCNC: 71 U/L (ref 40–150)
ALT SERPL W/O P-5'-P-CCNC: 19 U/L (ref 10–44)
ANION GAP SERPL CALC-SCNC: 10 MMOL/L (ref 8–16)
AST SERPL-CCNC: 28 U/L (ref 10–40)
BILIRUB SERPL-MCNC: 0.6 MG/DL (ref 0.1–1)
BUN SERPL-MCNC: 12 MG/DL (ref 6–20)
CALCIUM SERPL-MCNC: 9.6 MG/DL (ref 8.7–10.5)
CHLORIDE SERPL-SCNC: 104 MMOL/L (ref 95–110)
CO2 SERPL-SCNC: 24 MMOL/L (ref 23–29)
CREAT SERPL-MCNC: 0.9 MG/DL (ref 0.5–1.4)
EST. GFR  (NO RACE VARIABLE): >60 ML/MIN/1.73 M^2
FSH SERPL-ACNC: 2.6 MIU/ML (ref 0.95–11.95)
GLUCOSE SERPL-MCNC: 96 MG/DL (ref 70–110)
LH SERPL-ACNC: 1.1 MIU/ML (ref 0.6–12.1)
MAGNESIUM SERPL-MCNC: 1.7 MG/DL (ref 1.6–2.6)
PHOSPHATE SERPL-MCNC: 3.7 MG/DL (ref 2.7–4.5)
POTASSIUM SERPL-SCNC: 4.5 MMOL/L (ref 3.5–5.1)
PROT SERPL-MCNC: 7 G/DL (ref 6–8.4)
PTH-INTACT SERPL-MCNC: 33.3 PG/ML (ref 9–77)
SODIUM SERPL-SCNC: 138 MMOL/L (ref 136–145)

## 2025-02-21 PROCEDURE — 83002 ASSAY OF GONADOTROPIN (LH): CPT | Performed by: STUDENT IN AN ORGANIZED HEALTH CARE EDUCATION/TRAINING PROGRAM

## 2025-02-21 PROCEDURE — 80053 COMPREHEN METABOLIC PANEL: CPT | Performed by: STUDENT IN AN ORGANIZED HEALTH CARE EDUCATION/TRAINING PROGRAM

## 2025-02-21 PROCEDURE — 82306 VITAMIN D 25 HYDROXY: CPT | Performed by: STUDENT IN AN ORGANIZED HEALTH CARE EDUCATION/TRAINING PROGRAM

## 2025-02-21 PROCEDURE — 83001 ASSAY OF GONADOTROPIN (FSH): CPT | Performed by: STUDENT IN AN ORGANIZED HEALTH CARE EDUCATION/TRAINING PROGRAM

## 2025-02-21 PROCEDURE — 82040 ASSAY OF SERUM ALBUMIN: CPT | Performed by: STUDENT IN AN ORGANIZED HEALTH CARE EDUCATION/TRAINING PROGRAM

## 2025-02-21 PROCEDURE — 36415 COLL VENOUS BLD VENIPUNCTURE: CPT | Performed by: STUDENT IN AN ORGANIZED HEALTH CARE EDUCATION/TRAINING PROGRAM

## 2025-02-21 PROCEDURE — 84100 ASSAY OF PHOSPHORUS: CPT | Performed by: STUDENT IN AN ORGANIZED HEALTH CARE EDUCATION/TRAINING PROGRAM

## 2025-02-21 PROCEDURE — 83735 ASSAY OF MAGNESIUM: CPT | Performed by: STUDENT IN AN ORGANIZED HEALTH CARE EDUCATION/TRAINING PROGRAM

## 2025-02-21 PROCEDURE — 83970 ASSAY OF PARATHORMONE: CPT | Performed by: STUDENT IN AN ORGANIZED HEALTH CARE EDUCATION/TRAINING PROGRAM

## 2025-02-21 NOTE — ASSESSMENT & PLAN NOTE
MRI imaging showing possible talar neck stress fracture.  Continued daryl marrow edema noted.  Following with orthopedic surgery.  Now needing workup for metabolic bone issues with these findings.      Plan:  Order morning fasting labs: CMP, Magnesium, Phosphorus, Vitamin D (25-hydroxy), PTH.  Arrange for a bone density scan to evaluate for osteoporosis.  Follow with any activity modification that orthopedics may suggest to reduce further stress on the ankle.  Follow up with lab and imaging results to guide further management.

## 2025-02-21 NOTE — ASSESSMENT & PLAN NOTE
Reviewed of prior labs have showed a low normal total T level and also low free T level.  Labs prior to this not available for review but mentioned Total T levels under 200.  This could be a contributor to bone health.    Plan:  Order morning fasting labs: Testosterone panel (Total T, Free T, Bioavailable T, SHBG), LH, FSH.  Evaluate results to determine need for further endocrine workup or referral to urology for TRT (requires in-person visit).  Continue current Vitamin D supplementation; adjust if necessary based on lab results.  Follow up after lab review to determine next steps.

## 2025-02-21 NOTE — ASSESSMENT & PLAN NOTE
Poorly treated CLAUDIA could promote low testosterone levels so close follow up overtime is suggested if sleeping issues occur despite use of CPAP.

## 2025-02-21 NOTE — PATIENT INSTRUCTIONS
Thank you for visiting with me during our virtual appointment.  As mentioned we will be getting some labs completed with you soon.  Ideally these should be in the morning while fasting between 8 and 9 am.      I wanted to summarize our discussion and outline the next steps for evaluation.    At this time, I do not have a clear endocrine explanation for the issues with your ankle and the bone marrow edema. The mention of a possible stress fracture in the talus bone is concerning enough that we will proceed with further lab testing to better assess your bone health and overall metabolic status.    Next Steps: Lab Testing  You will need to complete a morning fasting blood draw for the following tests:  Testosterone Panel (Total T, Free T, Bioavailable T, SHBG): These measure different forms of testosterone and the sex hormone-binding globulin (SHBG), which can impact bone density.  LH (Luteinizing Hormone) & FSH (Follicle-Stimulating Hormone): These regulate testosterone production and can provide insight into any underlying hormonal imbalances.  Complete Metabolic Panel (CMP): Assesses overall metabolic function, kidney function, and electrolytes, which are important for bone health.  Magnesium & Phosphorus: Essential minerals that play a role in bone strength and metabolism.  Vitamin D (25-hydroxy): This is the key measure for vitamin D status, important for calcium absorption and bone health. (Your prior test checked 1,25 Vitamin D, but we need the 25 form now.)  Parathyroid Hormone (PTH): Regulates calcium and bone metabolism.    You can continue taking your current Vitamin D supplement, and I will notify you if any dose adjustments are needed based on results.    Bone Health Considerations  Testosterone & Bone Health: Your history of low testosterone could contribute to bone issues. This test will help assess if that is still a concern. If your levels are low and you want to pursue testosterone replacement  therapy (TRT), you will need an in-person visit with urology, as I am unable to prescribe it via virtual visits.  Bone Density Scan: We will be arranging this soon to check for any signs of osteoporosis or decreased bone density.  Alcohol & Bone Health: You previously consumed 2-3 drinks per day but have since reduced to 1-2 per week, which is beneficial for your bones. Excessive alcohol intake can weaken bones and increase the risk of fractures, so your recent changes should have a positive impact.    Next Steps  Once I have the results of your lab work and the bone density scan, I will review everything and follow up with you regarding next steps or any additional recommendations.    Reach out if you have questions

## 2025-02-24 ENCOUNTER — APPOINTMENT (OUTPATIENT)
Dept: RADIOLOGY | Facility: HOSPITAL | Age: 50
End: 2025-02-24
Attending: STUDENT IN AN ORGANIZED HEALTH CARE EDUCATION/TRAINING PROGRAM
Payer: OTHER GOVERNMENT

## 2025-02-24 ENCOUNTER — RESULTS FOLLOW-UP (OUTPATIENT)
Dept: ENDOCRINOLOGY | Facility: CLINIC | Age: 50
End: 2025-02-24
Payer: OTHER GOVERNMENT

## 2025-02-24 DIAGNOSIS — E29.1 HYPOGONADISM IN MALE: ICD-10-CM

## 2025-02-24 PROCEDURE — 77081 DXA BONE DENSITY APPENDICULR: CPT | Mod: 26,XU,ICN, | Performed by: RADIOLOGY

## 2025-02-24 PROCEDURE — 77081 DXA BONE DENSITY APPENDICULR: CPT | Mod: TC

## 2025-02-27 LAB
ALBUMIN SERPL-MCNC: 4.5 G/DL (ref 3.6–5.1)
SHBG SERPL-SCNC: 16 NMOL/L (ref 10–50)
TESTOST FREE SERPL-MCNC: 67.5 PG/ML (ref 46–224)
TESTOST SERPL-MCNC: 314 NG/DL (ref 250–1100)
TESTOSTERONE.FREE+WB SERPL-MCNC: 138.9 NG/DL

## 2025-03-06 ENCOUNTER — OFFICE VISIT (OUTPATIENT)
Dept: PAIN MEDICINE | Facility: CLINIC | Age: 50
End: 2025-03-06
Payer: OTHER GOVERNMENT

## 2025-03-06 VITALS
WEIGHT: 200 LBS | RESPIRATION RATE: 17 BRPM | SYSTOLIC BLOOD PRESSURE: 118 MMHG | BODY MASS INDEX: 28.63 KG/M2 | HEART RATE: 76 BPM | HEIGHT: 70 IN | DIASTOLIC BLOOD PRESSURE: 76 MMHG

## 2025-03-06 DIAGNOSIS — R93.7 BONE MARROW EDEMA: ICD-10-CM

## 2025-03-06 DIAGNOSIS — M77.50 CALCANEAL BURSITIS, UNSPECIFIED LATERALITY: ICD-10-CM

## 2025-03-06 DIAGNOSIS — M25.571 RIGHT ANKLE PAIN, UNSPECIFIED CHRONICITY: ICD-10-CM

## 2025-03-06 DIAGNOSIS — G90.521 COMPLEX REGIONAL PAIN SYNDROME TYPE 1 OF RIGHT LOWER EXTREMITY: Primary | ICD-10-CM

## 2025-03-06 PROCEDURE — 99214 OFFICE O/P EST MOD 30 MIN: CPT | Mod: PBBFAC | Performed by: ANESTHESIOLOGY

## 2025-03-06 PROCEDURE — 99999 PR PBB SHADOW E&M-EST. PATIENT-LVL IV: CPT | Mod: PBBFAC,,, | Performed by: ANESTHESIOLOGY

## 2025-03-06 NOTE — PROGRESS NOTES
Interventional Pain Progress Note       Referring Physician: No ref. provider found    PCP: No, Primary Doctor    Chief Complaint:     Chief Complaint   Patient presents with    Foot Pain     Patient has pain in right ankle/foot.  Pain level 3/10        SUBJECTIVE:    Interval History (03/06/2025):      Patient returns to clinic for evaluation of right foot pain.  Since last being seen, patient reports continued right foot pain primarily of the lateral aspect in his right ankle and right foot.  Patient reports intermittent burning sensations.  Patient denies any extension of the pain to his thigh.  Patient does report moderate swelling, but denies any significant allodynia, skin changes, or changes in sweat production.  Pain is currently rated a 3 out 10, but can increase to a 7/10 with exacerbating activity.  Patient is currently nonweightbearing to the right foot per Orthopedics.  Patient is also being evaluated by Endocrinology for bone metabolism and low testosterone      Initial HPI:     Paulino Marcial is a 49 y.o. male who presents to the clinic for the evaluation of right pain.   Patient reports 4 month history of right ankle and right foot pain.  Patient reports a series of minor injuries to his right ankle throughout his life between playing sports and serving in the .  Patient reports that inciting injury start when he rolled his ankle 4 months ago.  Patient denies any previous procedures on his right ankle.  Patient has had previous right hip arthroplasty  Pain described as stabbing aching pain over the medial and lateral malleolus as well as the plantar aspect of the foot primarily over the he will.  Patient reports this pain will radiate up the posterior aspect of his right lower extremity to the mid calf.  Patient denies any radiation extending above his right knee.  Patient denies any radiation to his toes.  Patient denies any significant left foot pain.  Patient does not recall any numbness or  tingling with the pain.  Pain is worse with bearing weight to his right foot, better with sitting down and elevation in his right lower extremity.  Pain is currently rated a 4/10, but can increase to a 9/10 with exacerbating activity.  Patient denies any fevers, chills, saddle anesthesia, or bowel and bladder incontinence.      Non-Pharmacologic Treatments:  Physical Therapy/Home Exercise: yes  Ice/Heat:yes  TENS: no  Acupuncture: no  Massage: no  Chiropractic: no        Previous Pain Medications:  Tylenol, ibuprofen, Flexeril, meloxicam, naproxen     report:  Reviewed and consistent with medication use as prescribed.    Pain Procedures:   none    Pain Disability Index Review:         3/6/2025     8:09 AM 1/29/2025    12:07 PM   Last 3 PDI Scores   Pain Disability Index (PDI) 57 56       Imaging:      MRI ANKLE WITHOUT CONTRAST RIGHT 12/06/2024     CLINICAL HISTORY: Right ankle pain     TECHNIQUE: Multiplanar multisequence imaging of the right ankle is performed without intravenous contrast.     FINDINGS:  There is a prominent anterior calcaneal process.  There is marked bone marrow edema in the anterior process of the calcaneus and sustentaculum alyson without fracture.  There is a small calcaneal cuboid joint effusion.  There is a synovial cyst arising from the dorsal margin of the talonavicular joint measuring 2.5 x 1.3 x 1.4 cm.  There is no fracture or avascular necrosis.  No tibiotalar joint effusion.     The plantar fascia is normal.  The Achilles tendon, peroneal tendons, and tendons the anterior and medial compartment are intact.  The high ankle ligaments, deltoid ligament, spring ligament, ATFL, posterior talofibular ligament, and calcaneofibular ligament are all intact.  The Lisfranc ligament is intact.        Impression:     1.  Bilateral marrow edema in the anterior process of the calcaneus and sustentaculum alyson without fracture.  This may be due to stress response.  2.  Small calcaneal cuboid joint  effusion.  3.  Synovial cyst arising the dorsal margin of the talonavicular joint measuring 2.5 x 1.4 x 1.3 cm.  4.  Intact ligaments and tendons.    Past Medical History:   Diagnosis Date    Anxiety     Chronic post-traumatic stress disorder (PTSD)     GERD (gastroesophageal reflux disease)     HTN (hypertension)      Past Surgical History:   Procedure Laterality Date    APPENDECTOMY      HIP ARTHROPLASTY      KNEE ARTHROPLASTY       Social History     Socioeconomic History    Marital status:    Tobacco Use    Smoking status: Every Day     Types: Vaping with nicotine    Smokeless tobacco: Former   Substance and Sexual Activity    Alcohol use: Yes     Alcohol/week: 2.0 standard drinks of alcohol     Types: 2 Cans of beer per week     Comment: rarely    Drug use: Never     Social Drivers of Health     Financial Resource Strain: Medium Risk (2/19/2025)    Overall Financial Resource Strain (CARDIA)     Difficulty of Paying Living Expenses: Somewhat hard   Food Insecurity: No Food Insecurity (2/19/2025)    Hunger Vital Sign     Worried About Running Out of Food in the Last Year: Never true     Ran Out of Food in the Last Year: Never true   Transportation Needs: No Transportation Needs (2/19/2025)    PRAPARE - Transportation     Lack of Transportation (Medical): No     Lack of Transportation (Non-Medical): No   Physical Activity: Inactive (2/19/2025)    Exercise Vital Sign     Days of Exercise per Week: 0 days     Minutes of Exercise per Session: 0 min   Stress: Stress Concern Present (2/19/2025)    [employer] [employer] - Occupational Stress Questionnaire     Feeling of Stress : Very much   Housing Stability: High Risk (2/19/2025)    Housing Stability Vital Sign     Unable to Pay for Housing in the Last Year: Yes     Number of Times Moved in the Last Year: 0     Homeless in the Last Year: No     Family History   Problem Relation Name Age of Onset    Thrombosis Neg Hx         Review of patient's  allergies indicates:   Allergen Reactions    Medrol [methylprednisolone] Anxiety       Current Outpatient Medications   Medication Sig    dextroamphetamine-amphetamine 10 mg Tab Take 1 tablet by mouth 2 (two) times daily.    LIDOcaine (LIDODERM) 5 % Place 1 patch onto the skin once daily. 12 hours on/12 hours off    LORazepam (ATIVAN) 0.5 MG tablet Take 0.5 mg by mouth daily as needed.    naproxen (NAPROSYN) 500 MG tablet Take 1 tablet (500 mg total) by mouth 2 (two) times daily. As needed with food.    omeprazole (PRILOSEC) 20 MG capsule Take 20 mg by mouth once daily.    pregabalin (LYRICA) 50 MG capsule Take 1 capsule (50 mg total) by mouth 2 (two) times daily.    promethazine (PHENERGAN) 25 MG tablet Take 1 tablet (25 mg total) by mouth every 6 (six) hours as needed (dizziness).    rizatriptan (MAXALT) 10 MG tablet Take 10 mg by mouth every 2 (two) hours as needed.    silver sulfADIAZINE 1% (SILVADENE) 1 % cream Apply topically 2 (two) times daily.    valsartan (DIOVAN) 40 MG tablet Take 40 mg by mouth every morning.     No current facility-administered medications for this visit.         ROS  Review of Systems   Constitutional:  Negative for activity change, appetite change and fever.   Respiratory:  Negative for cough, chest tightness, shortness of breath, wheezing and stridor.    Cardiovascular:  Negative for chest pain and palpitations.   Gastrointestinal:  Negative for abdominal pain, blood in stool, constipation, diarrhea, nausea and vomiting.   Endocrine: Negative for polydipsia, polyphagia and polyuria.   Genitourinary:  Negative for dysuria, hematuria and urgency.   Musculoskeletal:  Positive for arthralgias, gait problem and myalgias. Negative for back pain, joint swelling, neck pain and neck stiffness.   Skin:  Negative for rash.   Allergic/Immunologic: Negative for food allergies.   Neurological:  Positive for weakness. Negative for dizziness, tremors, seizures, syncope, facial asymmetry, speech  "difficulty, light-headedness, numbness and headaches.   Psychiatric/Behavioral:  Negative for agitation, hallucinations, self-injury and suicidal ideas. The patient is not nervous/anxious and is not hyperactive.             OBJECTIVE:  /76   Pulse 76   Resp 17   Ht 5' 10" (1.778 m)   Wt 90.7 kg (200 lb)   BMI 28.70 kg/m²         Physical Exam  Constitutional:       General: He is not in acute distress.     Appearance: Normal appearance. He is not ill-appearing.   HENT:      Head: Normocephalic and atraumatic.      Nose: No congestion or rhinorrhea.   Eyes:      Extraocular Movements: Extraocular movements intact.      Pupils: Pupils are equal, round, and reactive to light.   Cardiovascular:      Pulses: Normal pulses.   Pulmonary:      Effort: Pulmonary effort is normal.   Musculoskeletal:      Right foot: Decreased range of motion. Normal capillary refill. Swelling, tenderness and bony tenderness present. No crepitus. Normal pulse.      Left foot: Normal.      Comments: Tenderness to palpation over the lateral and medial malleolus as well as the Achilles tendon.  Mild swelling over the lateral malleolus.  No allodynia, skin changes, or sweat production changes present   Skin:     General: Skin is warm and dry.      Capillary Refill: Capillary refill takes less than 2 seconds.   Neurological:      General: No focal deficit present.      Mental Status: He is alert and oriented to person, place, and time.      Sensory: No sensory deficit.   Psychiatric:         Mood and Affect: Mood normal.         Behavior: Behavior normal.         Thought Content: Thought content normal.               LABS:  Lab Results   Component Value Date    WBC 7.3 08/01/2024    HGB 15.9 08/01/2024    HCT 47.8 08/01/2024    MCV 87 05/09/2023     08/01/2024       CMP  Sodium   Date Value Ref Range Status   02/21/2025 138 136 - 145 mmol/L Final     Potassium   Date Value Ref Range Status   02/21/2025 4.5 3.5 - 5.1 mmol/L Final "     Chloride   Date Value Ref Range Status   02/21/2025 104 95 - 110 mmol/L Final     CO2   Date Value Ref Range Status   02/21/2025 24 23 - 29 mmol/L Final     Glucose   Date Value Ref Range Status   02/21/2025 96 70 - 110 mg/dL Final     BUN   Date Value Ref Range Status   02/21/2025 12 6 - 20 mg/dL Final     Creatinine   Date Value Ref Range Status   02/21/2025 0.9 0.5 - 1.4 mg/dL Final     Calcium   Date Value Ref Range Status   02/21/2025 9.6 8.7 - 10.5 mg/dL Final     Total Protein   Date Value Ref Range Status   02/21/2025 7.0 6.0 - 8.4 g/dL Final     Albumin   Date Value Ref Range Status   02/21/2025 4.1 3.5 - 5.2 g/dL Final   02/21/2025 4.5 3.6 - 5.1 g/dL Final     Comment:     Test Performed at:  [facility] [address]  [id number]     TATUM Prince MD, PhD, PETER       Total Bilirubin   Date Value Ref Range Status   02/21/2025 0.6 0.1 - 1.0 mg/dL Final     Comment:     For infants and newborns, interpretation of results should be based  on gestational age, weight and in agreement with clinical  observations.    Premature Infant recommended reference ranges:  Up to 24 hours.............<8.0 mg/dL  Up to 48 hours............<12.0 mg/dL  3-5 days..................<15.0 mg/dL  6-29 days.................<15.0 mg/dL       Alkaline Phosphatase   Date Value Ref Range Status   02/21/2025 71 40 - 150 U/L Final     AST   Date Value Ref Range Status   02/21/2025 28 10 - 40 U/L Final     ALT   Date Value Ref Range Status   02/21/2025 19 10 - 44 U/L Final     Anion Gap   Date Value Ref Range Status   02/21/2025 10 8 - 16 mmol/L Final       Lab Results   Component Value Date    HGBA1C 5.7 (H) 08/01/2024             ASSESSMENT:       49 y.o. year old male with right foot pain, consistent with     1. Complex regional pain syndrome type 1 of right lower extremity  IR Sympathetic Nerve Block Lumbar (XPD)    Case Request-RAD/Other Procedure Area: Right lumbar sympathetic  block      2. Right ankle pain, unspecified chronicity        3. Calcaneal bursitis, unspecified laterality        4. Bone marrow edema          Complex regional pain syndrome type 1 of right lower extremity  -     IR Sympathetic Nerve Block Lumbar (XPD); Future; Expected date: 03/06/2025  -     Case Request-RAD/Other Procedure Area: Right lumbar sympathetic block    Right ankle pain, unspecified chronicity    Calcaneal bursitis, unspecified laterality    Bone marrow edema             PLAN:   - Interventions:   - schedule patient for right lumbar sympathetic block to evaluate response and possible diagnosis of CRPS.  - patient has continued right ankle pain with worsening of marrow edema of his right talus present on updated MRI of the right ankle.  Possible overlapping CRPS as patient has redness and swelling and pain out of proportion, though he does not have allodynia, or hair changes.  Patient is currently nonweightbearing to the right foot per Orthopedics.  Patient is currently being evaluated by Endocrinology for abnormal bone metabolism in low testosterone.   - it diagnosis looks more consistent with CRPS can consider dorsal root ganglion stimulation versus peripheral nerve stimulation.    - Anticoagulation use:   No no anticoagulation    - Medications:  - increase pregabalin to 50 mg twice a day  - can consider sodium antagonist such as Topamax if pregabalin is not effective  - recommend daily vitamin-C supplementation for CRPS pain  - continue compound cream as patient reports improvement with Lidoderm patches.  Patient will continue Lidoderm patches until compound cream was delivered to his house  - continue naproxen 500 mg twice a day    - Therapy:   - patient has completed 8 weeks of formal physical therapy with the last 3 months with continued pain.  Continue home exercises   - continue footwear recommendations from orthopedics.  Significant for bilateral marrow edema over the anterior process of the  calcaneus without fracture    - Imaging/Diagnostic:  - updated MRI of right ankle reveals worsening of marrow edema of right talus, calcaneus, navicular, cuboid, and cuneiform with possible stress fracture talar neck      - Consults:   - continue follow up with Orthopedics.  Patient currently nonweightbearing to right ankle  - continue follow up with Endocrinology.  Patient is currently being evaluated for abnormal bone metabolism and low testosterone      - Patient Questions: Answered all of the patient's questions regarding diagnosis, therapy, and treatment     This condition does not require this patient to take time off of work, and the primary goal of our Pain Management services is to improve the patient's functional capacity.     - Follow up visit: return to clinic 4 weeks after procedure    Visit today included increased complexity associated with the care of the episodic problem of chronic pain which was addressed and continue to manage the longitudinal care of the patient due to the serious and/or complex managed problem(s) listed above.      The above plan and management options were discussed at length with patient. Patient is in agreement with the above and verbalized understanding.    I discussed the goals of interventional chronic pain management with the patient on today's visit.  I explained the utility of injections for diagnostic and therapeutic purposes.  We discussed a multimodal approach to pain including treating the patient's given worst pain at any given time.  We will use a systematic approach to addressing pain.  We will also adopt a multimodal approach that includes injections, adjuvant medications, physical therapy, at times psychiatry.  There may be a limited role for opioid use intermittently in the treatment of pain, more particularly for acute pain although no one approach can be used as a sole treatment modality.    I emphasized the importance of regular exercise, core strengthening  and stretching, diet and weight loss as a cornerstone of long-term pain management.      Mauricio Emery MD  Interventional Pain Management  [facility] [place] [facility]    Future Appointments   Date Time Provider Department Center   3/24/2025  2:20 PM Inge Gómez MD [facility]   4/29/2025  9:15 AM Mauricio Emery MD [facility]           Disclaimer:  This note was prepared using voice recognition system and is likely to have sound alike errors that may have been overlooked even after proof reading.  Please call me with any questions      I spent a total of 40 minutes on the day of the visit.  This includes face to face time and non-face to face time preparing to see the patient (eg, review of tests), obtaining and/or reviewing separately obtained history, documenting clinical information in the electronic or other health record, independently interpreting results and communicating results to the patient/family/caregiver, or care coordinator.

## 2025-03-06 NOTE — H&P (VIEW-ONLY)
Interventional Pain Progress Note       Referring Physician: No ref. provider found    PCP: No, Primary Doctor    Chief Complaint:     Chief Complaint   Patient presents with    Foot Pain     Patient has pain in right ankle/foot.  Pain level 3/10        SUBJECTIVE:    Interval History (03/06/2025):      Patient returns to clinic for evaluation of right foot pain.  Since last being seen, patient reports continued right foot pain primarily of the lateral aspect in his right ankle and right foot.  Patient reports intermittent burning sensations.  Patient denies any extension of the pain to his thigh.  Patient does report moderate swelling, but denies any significant allodynia, skin changes, or changes in sweat production.  Pain is currently rated a 3 out 10, but can increase to a 7/10 with exacerbating activity.  Patient is currently nonweightbearing to the right foot per Orthopedics.  Patient is also being evaluated by Endocrinology for bone metabolism and low testosterone      Initial HPI:     Paulino Marcial is a 49 y.o. male who presents to the clinic for the evaluation of right pain.   Patient reports 4 month history of right ankle and right foot pain.  Patient reports a series of minor injuries to his right ankle throughout his life between playing sports and serving in the .  Patient reports that inciting injury start when he rolled his ankle 4 months ago.  Patient denies any previous procedures on his right ankle.  Patient has had previous right hip arthroplasty  Pain described as stabbing aching pain over the medial and lateral malleolus as well as the plantar aspect of the foot primarily over the he will.  Patient reports this pain will radiate up the posterior aspect of his right lower extremity to the mid calf.  Patient denies any radiation extending above his right knee.  Patient denies any radiation to his toes.  Patient denies any significant left foot pain.  Patient does not recall any numbness or  tingling with the pain.  Pain is worse with bearing weight to his right foot, better with sitting down and elevation in his right lower extremity.  Pain is currently rated a 4/10, but can increase to a 9/10 with exacerbating activity.  Patient denies any fevers, chills, saddle anesthesia, or bowel and bladder incontinence.      Non-Pharmacologic Treatments:  Physical Therapy/Home Exercise: yes  Ice/Heat:yes  TENS: no  Acupuncture: no  Massage: no  Chiropractic: no        Previous Pain Medications:  Tylenol, ibuprofen, Flexeril, meloxicam, naproxen     report:  Reviewed and consistent with medication use as prescribed.    Pain Procedures:   none    Pain Disability Index Review:         3/6/2025     8:09 AM 1/29/2025    12:07 PM   Last 3 PDI Scores   Pain Disability Index (PDI) 57 56       Imaging:      MRI ANKLE WITHOUT CONTRAST RIGHT 12/06/2024     CLINICAL HISTORY: Right ankle pain     TECHNIQUE: Multiplanar multisequence imaging of the right ankle is performed without intravenous contrast.     FINDINGS:  There is a prominent anterior calcaneal process.  There is marked bone marrow edema in the anterior process of the calcaneus and sustentaculum alyson without fracture.  There is a small calcaneal cuboid joint effusion.  There is a synovial cyst arising from the dorsal margin of the talonavicular joint measuring 2.5 x 1.3 x 1.4 cm.  There is no fracture or avascular necrosis.  No tibiotalar joint effusion.     The plantar fascia is normal.  The Achilles tendon, peroneal tendons, and tendons the anterior and medial compartment are intact.  The high ankle ligaments, deltoid ligament, spring ligament, ATFL, posterior talofibular ligament, and calcaneofibular ligament are all intact.  The Lisfranc ligament is intact.        Impression:     1.  Bilateral marrow edema in the anterior process of the calcaneus and sustentaculum alyson without fracture.  This may be due to stress response.  2.  Small calcaneal cuboid joint  effusion.  3.  Synovial cyst arising the dorsal margin of the talonavicular joint measuring 2.5 x 1.4 x 1.3 cm.  4.  Intact ligaments and tendons.    Past Medical History:   Diagnosis Date    Anxiety     Chronic post-traumatic stress disorder (PTSD)     GERD (gastroesophageal reflux disease)     HTN (hypertension)      Past Surgical History:   Procedure Laterality Date    APPENDECTOMY      HIP ARTHROPLASTY      KNEE ARTHROPLASTY       Social History     Socioeconomic History    Marital status:    Tobacco Use    Smoking status: Every Day     Types: Vaping with nicotine    Smokeless tobacco: Former   Substance and Sexual Activity    Alcohol use: Yes     Alcohol/week: 2.0 standard drinks of alcohol     Types: 2 Cans of beer per week     Comment: rarely    Drug use: Never     Social Drivers of Health     Financial Resource Strain: Medium Risk (2/19/2025)    Overall Financial Resource Strain (CARDIA)     Difficulty of Paying Living Expenses: Somewhat hard   Food Insecurity: No Food Insecurity (2/19/2025)    Hunger Vital Sign     Worried About Running Out of Food in the Last Year: Never true     Ran Out of Food in the Last Year: Never true   Transportation Needs: No Transportation Needs (2/19/2025)    PRAPARE - Transportation     Lack of Transportation (Medical): No     Lack of Transportation (Non-Medical): No   Physical Activity: Inactive (2/19/2025)    Exercise Vital Sign     Days of Exercise per Week: 0 days     Minutes of Exercise per Session: 0 min   Stress: Stress Concern Present (2/19/2025)    [employer] [place] [employer] - Occupational Stress Questionnaire     Feeling of Stress : Very much   Housing Stability: High Risk (2/19/2025)    Housing Stability Vital Sign     Unable to Pay for Housing in the Last Year: Yes     Number of Times Moved in the Last Year: 0     Homeless in the Last Year: No     Family History   Problem Relation Name Age of Onset    Thrombosis Neg Hx         Review of patient's  allergies indicates:   Allergen Reactions    Medrol [methylprednisolone] Anxiety       Current Outpatient Medications   Medication Sig    dextroamphetamine-amphetamine 10 mg Tab Take 1 tablet by mouth 2 (two) times daily.    LIDOcaine (LIDODERM) 5 % Place 1 patch onto the skin once daily. 12 hours on/12 hours off    LORazepam (ATIVAN) 0.5 MG tablet Take 0.5 mg by mouth daily as needed.    naproxen (NAPROSYN) 500 MG tablet Take 1 tablet (500 mg total) by mouth 2 (two) times daily. As needed with food.    omeprazole (PRILOSEC) 20 MG capsule Take 20 mg by mouth once daily.    pregabalin (LYRICA) 50 MG capsule Take 1 capsule (50 mg total) by mouth 2 (two) times daily.    promethazine (PHENERGAN) 25 MG tablet Take 1 tablet (25 mg total) by mouth every 6 (six) hours as needed (dizziness).    rizatriptan (MAXALT) 10 MG tablet Take 10 mg by mouth every 2 (two) hours as needed.    silver sulfADIAZINE 1% (SILVADENE) 1 % cream Apply topically 2 (two) times daily.    valsartan (DIOVAN) 40 MG tablet Take 40 mg by mouth every morning.     No current facility-administered medications for this visit.         ROS  Review of Systems   Constitutional:  Negative for activity change, appetite change and fever.   Respiratory:  Negative for cough, chest tightness, shortness of breath, wheezing and stridor.    Cardiovascular:  Negative for chest pain and palpitations.   Gastrointestinal:  Negative for abdominal pain, blood in stool, constipation, diarrhea, nausea and vomiting.   Endocrine: Negative for polydipsia, polyphagia and polyuria.   Genitourinary:  Negative for dysuria, hematuria and urgency.   Musculoskeletal:  Positive for arthralgias, gait problem and myalgias. Negative for back pain, joint swelling, neck pain and neck stiffness.   Skin:  Negative for rash.   Allergic/Immunologic: Negative for food allergies.   Neurological:  Positive for weakness. Negative for dizziness, tremors, seizures, syncope, facial asymmetry, speech  "difficulty, light-headedness, numbness and headaches.   Psychiatric/Behavioral:  Negative for agitation, hallucinations, self-injury and suicidal ideas. The patient is not nervous/anxious and is not hyperactive.             OBJECTIVE:  /76   Pulse 76   Resp 17   Ht 5' 10" (1.778 m)   Wt 90.7 kg (200 lb)   BMI 28.70 kg/m²         Physical Exam  Constitutional:       General: He is not in acute distress.     Appearance: Normal appearance. He is not ill-appearing.   HENT:      Head: Normocephalic and atraumatic.      Nose: No congestion or rhinorrhea.   Eyes:      Extraocular Movements: Extraocular movements intact.      Pupils: Pupils are equal, round, and reactive to light.   Cardiovascular:      Pulses: Normal pulses.   Pulmonary:      Effort: Pulmonary effort is normal.   Musculoskeletal:      Right foot: Decreased range of motion. Normal capillary refill. Swelling, tenderness and bony tenderness present. No crepitus. Normal pulse.      Left foot: Normal.      Comments: Tenderness to palpation over the lateral and medial malleolus as well as the Achilles tendon.  Mild swelling over the lateral malleolus.  No allodynia, skin changes, or sweat production changes present   Skin:     General: Skin is warm and dry.      Capillary Refill: Capillary refill takes less than 2 seconds.   Neurological:      General: No focal deficit present.      Mental Status: He is alert and oriented to person, place, and time.      Sensory: No sensory deficit.   Psychiatric:         Mood and Affect: Mood normal.         Behavior: Behavior normal.         Thought Content: Thought content normal.               LABS:  Lab Results   Component Value Date    WBC 7.3 08/01/2024    HGB 15.9 08/01/2024    HCT 47.8 08/01/2024    MCV 87 05/09/2023     08/01/2024       CMP  Sodium   Date Value Ref Range Status   02/21/2025 138 136 - 145 mmol/L Final     Potassium   Date Value Ref Range Status   02/21/2025 4.5 3.5 - 5.1 mmol/L Final "     Chloride   Date Value Ref Range Status   02/21/2025 104 95 - 110 mmol/L Final     CO2   Date Value Ref Range Status   02/21/2025 24 23 - 29 mmol/L Final     Glucose   Date Value Ref Range Status   02/21/2025 96 70 - 110 mg/dL Final     BUN   Date Value Ref Range Status   02/21/2025 12 6 - 20 mg/dL Final     Creatinine   Date Value Ref Range Status   02/21/2025 0.9 0.5 - 1.4 mg/dL Final     Calcium   Date Value Ref Range Status   02/21/2025 9.6 8.7 - 10.5 mg/dL Final     Total Protein   Date Value Ref Range Status   02/21/2025 7.0 6.0 - 8.4 g/dL Final     Albumin   Date Value Ref Range Status   02/21/2025 4.1 3.5 - 5.2 g/dL Final   02/21/2025 4.5 3.6 - 5.1 g/dL Final     Comment:     Test Performed at:  [facility] [address]  [id number]     TATUM Prince MD, PhD, PETER       Total Bilirubin   Date Value Ref Range Status   02/21/2025 0.6 0.1 - 1.0 mg/dL Final     Comment:     For infants and newborns, interpretation of results should be based  on gestational age, weight and in agreement with clinical  observations.    Premature Infant recommended reference ranges:  Up to 24 hours.............<8.0 mg/dL  Up to 48 hours............<12.0 mg/dL  3-5 days..................<15.0 mg/dL  6-29 days.................<15.0 mg/dL       Alkaline Phosphatase   Date Value Ref Range Status   02/21/2025 71 40 - 150 U/L Final     AST   Date Value Ref Range Status   02/21/2025 28 10 - 40 U/L Final     ALT   Date Value Ref Range Status   02/21/2025 19 10 - 44 U/L Final     Anion Gap   Date Value Ref Range Status   02/21/2025 10 8 - 16 mmol/L Final       Lab Results   Component Value Date    HGBA1C 5.7 (H) 08/01/2024             ASSESSMENT:       49 y.o. year old male with right foot pain, consistent with     1. Complex regional pain syndrome type 1 of right lower extremity  IR Sympathetic Nerve Block Lumbar (XPD)    Case Request-RAD/Other Procedure Area: Right lumbar sympathetic  block      2. Right ankle pain, unspecified chronicity        3. Calcaneal bursitis, unspecified laterality        4. Bone marrow edema          Complex regional pain syndrome type 1 of right lower extremity  -     IR Sympathetic Nerve Block Lumbar (XPD); Future; Expected date: 03/06/2025  -     Case Request-RAD/Other Procedure Area: Right lumbar sympathetic block    Right ankle pain, unspecified chronicity    Calcaneal bursitis, unspecified laterality    Bone marrow edema             PLAN:   - Interventions:   - schedule patient for right lumbar sympathetic block to evaluate response and possible diagnosis of CRPS.  - patient has continued right ankle pain with worsening of marrow edema of his right talus present on updated MRI of the right ankle.  Possible overlapping CRPS as patient has redness and swelling and pain out of proportion, though he does not have allodynia, or hair changes.  Patient is currently nonweightbearing to the right foot per Orthopedics.  Patient is currently being evaluated by Endocrinology for abnormal bone metabolism in low testosterone.   - it diagnosis looks more consistent with CRPS can consider dorsal root ganglion stimulation versus peripheral nerve stimulation.    - Anticoagulation use:   No no anticoagulation    - Medications:  - increase pregabalin to 50 mg twice a day  - can consider sodium antagonist such as Topamax if pregabalin is not effective  - recommend daily vitamin-C supplementation for CRPS pain  - continue compound cream as patient reports improvement with Lidoderm patches.  Patient will continue Lidoderm patches until compound cream was delivered to his house  - continue naproxen 500 mg twice a day    - Therapy:   - patient has completed 8 weeks of formal physical therapy with the last 3 months with continued pain.  Continue home exercises   - continue footwear recommendations from orthopedics.  Significant for bilateral marrow edema over the anterior process of the  calcaneus without fracture    - Imaging/Diagnostic:  - updated MRI of right ankle reveals worsening of marrow edema of right talus, calcaneus, navicular, cuboid, and cuneiform with possible stress fracture talar neck      - Consults:   - continue follow up with Orthopedics.  Patient currently nonweightbearing to right ankle  - continue follow up with Endocrinology.  Patient is currently being evaluated for abnormal bone metabolism and low testosterone      - Patient Questions: Answered all of the patient's questions regarding diagnosis, therapy, and treatment     This condition does not require this patient to take time off of work, and the primary goal of our Pain Management services is to improve the patient's functional capacity.     - Follow up visit: return to clinic 4 weeks after procedure    Visit today included increased complexity associated with the care of the episodic problem of chronic pain which was addressed and continue to manage the longitudinal care of the patient due to the serious and/or complex managed problem(s) listed above.      The above plan and management options were discussed at length with patient. Patient is in agreement with the above and verbalized understanding.    I discussed the goals of interventional chronic pain management with the patient on today's visit.  I explained the utility of injections for diagnostic and therapeutic purposes.  We discussed a multimodal approach to pain including treating the patient's given worst pain at any given time.  We will use a systematic approach to addressing pain.  We will also adopt a multimodal approach that includes injections, adjuvant medications, physical therapy, at times psychiatry.  There may be a limited role for opioid use intermittently in the treatment of pain, more particularly for acute pain although no one approach can be used as a sole treatment modality.    I emphasized the importance of regular exercise, core strengthening  and stretching, diet and weight loss as a cornerstone of long-term pain management.      Mauricio Emery MD  Interventional Pain Management  [facility] [place]    Future Appointments   Date Time Provider Department Center   3/24/2025  2:20 PM Inge Gómez MD [facility]   4/29/2025  9:15 AM Mauricio Emery MD [facility]           Disclaimer:  This note was prepared using voice recognition system and is likely to have sound alike errors that may have been overlooked even after proof reading.  Please call me with any questions      I spent a total of 40 minutes on the day of the visit.  This includes face to face time and non-face to face time preparing to see the patient (eg, review of tests), obtaining and/or reviewing separately obtained history, documenting clinical information in the electronic or other health record, independently interpreting results and communicating results to the patient/family/caregiver, or care coordinator.

## 2025-03-11 ENCOUNTER — PATIENT MESSAGE (OUTPATIENT)
Dept: ORTHOPEDICS | Facility: CLINIC | Age: 50
End: 2025-03-11
Payer: OTHER GOVERNMENT

## 2025-03-11 ENCOUNTER — PATIENT MESSAGE (OUTPATIENT)
Dept: PAIN MEDICINE | Facility: CLINIC | Age: 50
End: 2025-03-11
Payer: OTHER GOVERNMENT

## 2025-03-11 DIAGNOSIS — M77.50 CALCANEAL BURSITIS, UNSPECIFIED LATERALITY: ICD-10-CM

## 2025-03-11 DIAGNOSIS — M25.571 RIGHT ANKLE PAIN, UNSPECIFIED CHRONICITY: ICD-10-CM

## 2025-03-11 RX ORDER — PREGABALIN 50 MG/1
50 CAPSULE ORAL 3 TIMES DAILY
Qty: 90 CAPSULE | Refills: 2 | Status: SHIPPED | OUTPATIENT
Start: 2025-03-11

## 2025-03-13 ENCOUNTER — PATIENT MESSAGE (OUTPATIENT)
Dept: PAIN MEDICINE | Facility: HOSPITAL | Age: 50
End: 2025-03-13
Payer: OTHER GOVERNMENT

## 2025-03-13 NOTE — PRE-PROCEDURE INSTRUCTIONS
Spoke with patient regarding procedure scheduled on 3.26     Arrival time 0900     Has patient been sick with fever or on antibiotics within the last 7 days? No     Does the patient have any open wounds, sores or rashes? No     Does the patient have any recent fractures? no     Has patient received a vaccination within the last 7 days? No     Received the COVID vaccination? yes     Has the patient stopped all medications as directed? na     Does patient have a pacemaker, defibrillator, or implantable stimulator? No     Does the patient have a ride to and from procedure and someone reliable to remain with patient?  wife     Is the patient diabetic? no      Does the patient have sleep apnea? Or use O2 at home? annie on cpap     Is the patient receiving sedation? Yes      Is the patient instructed to remain NPO beginning at midnight the night before their procedure? yes     Procedure location confirmed with patient? Yes     Covid- Denies signs/symptoms. Instructed to notify PAT/MD if any changes.

## 2025-03-13 NOTE — PRE-PROCEDURE INSTRUCTIONS
Spoke with patient regarding procedure scheduled on 2.26     Arrival time 0900     Has patient been sick with fever or on antibiotics within the last 7 days? No     Does the patient have any open wounds, sores or rashes? No     Does the patient have any recent fractures? no     Has patient received a vaccination within the last 7 days? No     Received the COVID vaccination? yes     Has the patient stopped all medications as directed? na     Does patient have a pacemaker, defibrillator, or implantable stimulator? No     Does the patient have a ride to and from procedure and someone reliable to remain with patient?  wife     Is the patient diabetic? no      Does the patient have sleep apnea? Or use O2 at home? annie on cpap      Is the patient receiving sedation? Yes      Is the patient instructed to remain NPO beginning at midnight the night before their procedure? yes     Procedure location confirmed with patient? Yes     Covid- Denies signs/symptoms. Instructed to notify PAT/MD if any changes.

## 2025-03-20 NOTE — PROGRESS NOTES
[id number] [facility] [place] [address]   Phone [phone]  Fax [phone]           CHIEF COMPLAINT: Pain of the Right Ankle  HISTORY OF PRESENT ILLNESS JN (03/24/2025):  History of Present Illness    HPI:  Paulino presents for follow-up of a right foot injury that occurred in October 2024. He describes the initial injury as a significant roll but admits to potentially burying or not focusing on pain at the time. He states that even if there was pain, he does not remember it, but he does remember the event that he thought was going to lead to bruising.    He is currently at the six-week gracie of non-weight bearing treatment. He has been using an iWalk, which he prefers over crutches, and a scooter for longer distances. Over the past week, he has started putting weight on the affected foot, reporting that it felt better. This coincided with an increase in his pregabalin dose from 100 to 150 mg daily.    His right glute muscle has gotten much bigger than his left side, while his calf has gotten smaller. Certain motions can cause pain. He reports severe pain earlier in his recovery, stating he had never woken up in the middle of the night in that much pain. Recently, he has noticed difficulty with shoes that have an arch, finding flat shoes like Vans or [place] more comfortable to walk in. He has also been able to walk in Birkenstock sandals.    He reports brain fog and palpitations with the increased medication, though he acknowledges this could be related to anxiety. He is nervous about an upcoming nerve block scheduled for Wednesday, relating it to anxiety stemming from a C5 fracture he experienced in 1998.    Paulino expresses concern about the uncertainty of his diagnosis, mentioning that he has resumed seeing his neuropsychologist due to the stress of the situation. He states that the uncertainty is affecting him significantly.    Paulino denies having CRPS, though it has been  discussed as a potential diagnosis.          Disclaimer: The history above was obtained through ambient listening technology thus may contain errors.     HISTORY OF PRESENT ILLNESS JN (02/10/2025):  Paulino presents for  MRI review andfollow-up regarding ongoing foot pain. An MRI shows worsening bone bruising in the talus and navicular bones compared to a previous scan. Pain has been severe. Paulino reports improvement since using crutches and avoiding weight-bearing for about a week. His free testosterone was slightly low about six months ago but within normal range.    Recently, patient had pneumonia, describing it as extremely painful. Paulino states it started as flu and then aspiration occurred. Paulino was incapacitated for approximately six days.    Paulino has been prescribed lidocaine patches and Lyrica, which was intended to replace gabapentin and cause less drowsiness. However, patient reports only taking Lyrica once at night instead of the prescribed twice daily due to sleepiness. Paulino expresses concern about the possibility of a fracture and mentions having PTSD, stating that idleness exacerbates PTSD symptoms. Paulino has already contacted psychologist regarding this concern.    Paulino denies being super sensitive to touch. Paulino denies having complex regional pain syndrome.    PREVIOUS TREATMENTS:  - Lidocaine patches: Currently using  - Lyrica: Taking once at night instead of prescribed twice daily, due to sleepiness  - Gabapentin: Previously taken, caused sleepiness  - Crutches: Used for about a week to avoid weight-bearing on the affected foot    MEDICATIONS:  - Lyrica: Once at night, sleepiness  - Lidocaine patches          HISTORY OF PRESENT ILLNESS JN (03/25/2025):    Paulino presents with right foot pain and swelling from an injury in October. Pain has been worsening, described as severe enough to wake him from sleep. He reports taking up to 3200 mg of ibuprofen daily for pain management,  stating that he cannot walk without medication due to the severe pain. Ice therapy 3-4 times daily and a TENS machine 1-2 times daily provide minimal relief. He also uses Voltaren gel, an anti-inflammatory topical treatment which he reports also does not work    An MRI two months ago revealed bone bruising, soft tissue swelling, and cysts in the foot. Paulino has been taking high doses of Vitamin D (up to 20,000 IUs daily) and K2 supplements. He sometimes experiences itching in the affected area, which he attributes to the swelling.    He expresses frustration with the slow healing process and the impact on his daily life, particularly his ability to work. He states that this injury has been more challenging to manage than previous significant injuries. His medical history includes a C5 fracture, major hip surgery, and knee surgery.    Paulino denies sheets or light touch bothering him at night.  The sheets on his bed do not bother him    PREVIOUS TREATMENTS:  - Ice therapy: 3-4 times daily, minimal relief  - TENS machine: 1-2 times daily, minimal relief  - Voltaren gel: Anti-inflammatory topical treatment    MEDICATIONS:  - Ibuprofen: 3200 mg daily since October  - Omeprazole: daily  - Vitamin D: 20,000 IU daily with K2  - Naproxen: alternative to ibuprofen    SURGICAL HISTORY:  - Hip surgery  - Knee surgery  - C5 fracture            HISTORY OF PRESENT ILLNESS (01/06/2025):  Paulino presents with ongoing foot and ankle issues, experiencing swelling in his ankle that has been moving to different areas. He reports tingling on the bottom of his foot and side of his toes. Paulino notes significant difficulty walking two days ago. The swelling is primarily in the lateral ankle area.    He mentions having seven anchors in his hip, which causes discomfort. He tried wearing a boot previously, which helped initially, but stopped using it when he returned to work. He explains that he is a  and finds it difficult to be  inactive.    Paulino reports taking vitamin D3 K2 supplements, about 10,000 IU a day. 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    He mentions having rolled his ankle in October, which may be the cause of his current issues. He expresses concern about the possibility of cancer.    Paulino reports experiencing pain in the underneath part of the ankle and heel, which he associates with bone bruising. He mentions having had this before but notes that the current issues are more in the upper part of his foot and ankle. Paulino also mentions having a cyst in his ankle.    Paulino's medical history includes right knee surgery, seven anchors in his hip, and shoulder surgery for an impingement where part of his clavicle was removed. He attributes some of these issues to his  service, particularly carrying gear on his right side.    Paulino denies pain over a specific area when asked about his hip. Paulino denies any medical history of cancer.    MEDICATIONS:  - Vitamin D3 K2: 10,000 IU daily    SURGICAL HISTORY:  - Right knee surgery  - Hip surgery: seven anchors placed  - Shoulder surgery: part of the clavicle bone (about an inch) removed due to an impingement          HISTORY OF PRESENT ILLNESS (12/10/2024):  Paulino presents FOR AN MRI REVIEW with ankle pain that started about a month and a half ago in October after rolling his ankle, which felt different from previous ankle rolls. He states that this time it felt different. The pain is significantly better than it was, but he believes he's starting to compensate, causing pain underneath the ankle. He reports some improvement with ibuprofen use but expresses frustration with his limited ability to engage in activities.    He mentions feeling occasional pressure and pain on the top of his foot, which he believes might be related to a cyst  "identified on the MRI. He has a history of low vitamin D levels and is concerned about his ability to lift weights and engage in other activities. He describes wearing various types of shoes, including Nikes, Jordans, [employer] Ones, and Vans, and mentions using Superfeet inserts.    The prescribed meloxicam is not effective for pain relief, with the patient stating its efficacy diminishes about long-term through the day. He denies any concerns for cancer, medical history of diabetes, hypertension, or other chronic conditions.    MEDICATIONS:  - Meloxicam: Not effective, especially by long-term through the day  - Vitamin D: History of low levels  - Ibuprofen: Provides much more relief          HISTORY OF PRESENT ILLNESS (11/22/2024):  HPI:  Paulino presents for follow-up regarding an ankle injury. He reports improvement, stating that it's significantly better than before and he can now walk without much of a limp. He has been wearing a boot, which he believes has helped. He is currently taking meloxicam for pain management.    He reports ongoing pain in a specific area of the ankle, with a particular incident of pain waking him up the previous night. He describes the pain as feeling like "a pair of needles." Paulino typically experiences pain when flexing his foot, and by the end of the day as the meloxicam starts to wear off. He's experiencing some pain, but it's significantly less than when he first started treatment.    The initial injury occurred when he turned his ankle at home, but he mentions experiencing similar incidents in the past without significant issues. He expresses concern about the duration of this particular episode, stating that it has taken longer to heal than any previous incidents.    Paulino denies significant pain when moving his foot outward or holding it in that position. Paulino denies any visible bruising from the initial injury.            HISTORY OF PRESENT ILLNESS (11/15/2024):    Paulino " presents with right lateral hindfoot pain pain that has persisted for at least three weeks and has progressively worsened. He localizes the pain to the lateral aspect of his foot. He recalls rolling his ankle at home but cannot pinpoint a specific injury as the cause. He reports playing basketball and having a long  career, noting it's not uncommon for him to experience foot pain, but states this episode is unusually prolonged.    He describes swelling in his ankle, which he can feel near the Achilles tendon. He mentions taking a significant amount of ibuprofen to manage daily activities, which is atypical for him. He reports feeling pressure throughout his foot with movement, describing it as a sensation of swelling without acute pain.    His activity level has decreased due to the foot pain.  He has never had gout. He cannot take steroid Dosepaks due to issues with his blood pressure.    He denies recalling a specific injury to his foot. He denies having diabetes or a history of gout.    He has taken ibuprofen for pain management.     SURGICAL HISTORY:  - Hip surgery: resulted in cessation of running  - Left knee meniscus repair: Approximately 3 months after hip surgery  - Shoulder surgery: Last year    SOCIAL HISTORY:  - Long  career  - Right-sided body issues due to  service  - Previously ran 40-50 miles per week          PAST MEDICAL HISTORY:    Past Medical History:   Diagnosis Date    Anxiety     Chronic post-traumatic stress disorder (PTSD)     GERD (gastroesophageal reflux disease)     HTN (hypertension)        Hemoglobin A1C   Date Value Ref Range Status   08/01/2024 5.7 (H) 4.8 - 5.6 % Final     Comment:              Prediabetes: 5.7 - 6.4           Diabetes: >6.4           Glycemic control for adults with diabetes: <7.0        PAST SURGICAL HISTORY:    Past Surgical History:   Procedure Laterality Date    APPENDECTOMY      HIP ARTHROPLASTY      KNEE ARTHROPLASTY           MEDICATIONS:    Current Outpatient Medications:     dextroamphetamine-amphetamine 10 mg Tab, Take 1 tablet by mouth 2 (two) times daily., Disp: , Rfl:     LIDOcaine (LIDODERM) 5 %, Place 1 patch onto the skin once daily. 12 hours on/12 hours off, Disp: 15 patch, Rfl: 1    LORazepam (ATIVAN) 0.5 MG tablet, Take 0.5 mg by mouth daily as needed., Disp: , Rfl:     naproxen (NAPROSYN) 500 MG tablet, Take 1 tablet (500 mg total) by mouth 2 (two) times daily. As needed with food., Disp: 60 tablet, Rfl: 0    omeprazole (PRILOSEC) 20 MG capsule, Take 20 mg by mouth once daily., Disp: , Rfl:     pregabalin (LYRICA) 50 MG capsule, Take 1 capsule (50 mg total) by mouth 3 (three) times daily., Disp: 90 capsule, Rfl: 2    promethazine (PHENERGAN) 25 MG tablet, Take 1 tablet (25 mg total) by mouth every 6 (six) hours as needed (dizziness)., Disp: 20 tablet, Rfl: 0    rizatriptan (MAXALT) 10 MG tablet, Take 10 mg by mouth every 2 (two) hours as needed., Disp: , Rfl:     silver sulfADIAZINE 1% (SILVADENE) 1 % cream, Apply topically 2 (two) times daily., Disp: 25 g, Rfl: 0    valsartan (DIOVAN) 40 MG tablet, Take 40 mg by mouth every morning., Disp: , Rfl:      There are no discontinued medications.      ALLERGIES:     Review of patient's allergies indicates:   Allergen Reactions    Medrol [methylprednisolone] Anxiety            FAMILY HISTORY:   Family History   Problem Relation Name Age of Onset    Thrombosis Neg Hx             SOCIAL HISTORY:    Social History     Socioeconomic History    Marital status:    Tobacco Use    Smoking status: Every Day     Types: Vaping with nicotine    Smokeless tobacco: Former   Substance and Sexual Activity    Alcohol use: Yes     Alcohol/week: 2.0 standard drinks of alcohol     Types: 2 Cans of beer per week     Comment: rarely    Drug use: Never     Social Drivers of Health     Financial Resource Strain: Medium Risk (2/19/2025)    Overall Financial Resource Strain (CARDIA)     Difficulty  "of Paying Living Expenses: Somewhat hard   Food Insecurity: No Food Insecurity (2/19/2025)    Hunger Vital Sign     Worried About Running Out of Food in the Last Year: Never true     Ran Out of Food in the Last Year: Never true   Transportation Needs: No Transportation Needs (2/19/2025)    PRAPARE - Transportation     Lack of Transportation (Medical): No     Lack of Transportation (Non-Medical): No   Physical Activity: Inactive (2/19/2025)    Exercise Vital Sign     Days of Exercise per Week: 0 days     Minutes of Exercise per Session: 0 min   Stress: Stress Concern Present (2/19/2025)    [employer] - Occupational Stress Questionnaire     Feeling of Stress : Very much   Housing Stability: High Risk (2/19/2025)    Housing Stability Vital Sign     Unable to Pay for Housing in the Last Year: Yes     Number of Times Moved in the Last Year: 0     Homeless in the Last Year: No         PHYSICAL EXAMINATION:     Estimated body mass index is 28.69 kg/m² as calculated from the following:    Height as of 3/6/25: 5' 10" (1.778 m).    Weight as of this encounter: 90.7 kg (199 lb 15.3 oz).   ASSISTIVE DEVICE:  Boot with I walk    Ankle Exam (affected extremity):   Inspection:         Gross deformity   (-)         Erythema   (-)        Ecchymosis   (-)          Swelling   (+) diffusely over hindfoot and midfoot but moderate only in the lateral aspect of the ankle.  Has not improved significantly since his last visit           Open wounds   (-)         Surgical scars   (-)                    Palpation tenderness:  Bony:  Hindfoot:  Proximal fibula  (-)  Calcaneus  (+)   Distal fibula  (-)  Talus   (+)   Medial malleolus  (-)  CC joint/cuboid  (+)   Tibiotalar joint  (-)  Lateral gutter  (-)  Plantar fascia  (-)  Medial gutter  (-)   Midfoot:   TN joint   (-)   NC joints   (-)   TMT joints   (-)   Forefoot:   (-)      Soft tissue:     Tendons:    Achilles midsubstance (-)  Peroneal tendons  (+) "   Achilles insertion  (-)  Posterior tibial tendon (-)   Retrocalcaneal bursa (-)  Anterior tibial tendon  (-)   Ligaments:   ATFL   (+)  Deltoid   (-)   CFL   (+)  Syndesmosis  (-)  ROM:  Affected Ankle:         DF:    10°        PF:    40°                 Pain    (+)       Crepitance   (-)       Sensory:  Normal sensation to light touch in Sa/Cordon/DP/SP/T nerve distributions      Motor:               Fires EHL/FHL/Tibialis anterior/Gastrocsoleus   Vascular:  Foot WWP with brisk capillary refill    DP/PT pulses palpable  Special Tests:  Montes   (-)  Anterior drawer   (-)  Calcaneal squeeze  (+)  Syndesmotic squeeze  (-)  He does not have overt sensitivity to touching      IMAGING:      DXA Bone Density Axial Skeleton 1 or more sites  Narrative: EXAMINATION:  DXA BONE DENSITY AXIAL SKELETON 1 OR MORE SITES    CLINICAL HISTORY:  Osteoporosis evaluation.  Hypogonadism.  Possible stress fracture of ankle.; Testicular hypofunction    TECHNIQUE:  DXA scanning was performed over the left hip and lumbar spine.  Review of the images confirms satisfactory positioning and technique.    COMPARISON:  None    FINDINGS:  The L1 to L4 vertebral bone mineral density is equal to 1.092 g/cm squared with a T score of -0.8.    The left femoral neck bone mineral density is equal to 0.936 g/cm squared with a T score of -0.7.  Impression: No evidence of significant bone density loss    Consider FDA approved medical therapies in postmenopausal women and men aged 50 years and older, based on the following:    *A hip or vertebral (clinical or morphometric) fracture  *T score less than or equal to -2.5 at the femoral neck or spine after appropriate evaluation to exclude secondary causes.  *Low bone mass -- also known as osteopenia (T score between -1.0 and -2.5 at the femoral neck or spine) and a 10 year probability of hip fracture greater than or equal to 3% or a 10 year probability of major osteoporosis-related fracture greater than or  equal to 20% based on the [place]-adapted WHO algorithm.  *Clinicians judgment and/or patient preference may indicate treatment for people with 10 year fracture probabilities is above or below these levels.    Electronically signed by: Gurdeep Brink DO  Date:    02/24/2025  Time:    10:38  DXA Bone Density Appendicular Skeleton  Narrative: EXAMINATION:  DEXA BONE DENSITY APPENDICULAR SKELETON    CLINICAL HISTORY:  Osteoporosis evaluation. Hypogonadism. Possible stress fracture of ankle.; Testicular hypofunction    TECHNIQUE:  DXA scanning was performed over the left forearm.  Review of the images confirms satisfactory positioning and technique.    COMPARISON:  None.    FINDINGS:  The left forearm (radius 33%) bone mineral density is equal to 0.968 g/cm squared with a T score of 1.1.  Impression: No evidence of significant bone density loss.    Consider FDA approved medical therapies in postmenopausal women age 50 years and older, based on the following:    A hip or vertebral (clinical or morphometric) fracture    1. T score less than or equal to -2.5 at the femoral neck or spine after appropriate evaluation to exclude secondary causes.  2. Low bone mass -- also known as osteopenia (T score between -1.0 and -2.5 at the femoral neck or spine) and a 10 year probability of hip fracture greater than or equal to 3% or a 10 year probability of major osteoporosis-related fracture greater than 20% based on the [place] adaptive WHO algorithm.  3. Clinician's judgment and/or patient preference may indicate treatment for people with 10 year fracture probabilities is above or below these levels.    Electronically signed by: Gurdeep Brink DO  Date:    02/24/2025  Time:    10:37           ASSESSMENT: 49 y.o. male  with:   Right diffuse hindfoot swelling with majority of pain centered over peroneals present since October 20, 2024 after a trauma with MRI on 12/06/2024 demonstrating:  Bilateral marrow edema in the anterior process of the  calcaneus and sustentaculum alyson without fracture.  This may be due to stress response., Small calcaneal cuboid joint effusion, Synovial cyst arising the dorsal margin of the talonavicular joint measuring 2.5 x 1.4 x 1.3 cm,  Intact ligaments and tendons.  Can not take steroids due elevations blood pressure caused by steroids  Normal vitamin-D  Negative rheumatoid factor and GIOVANNI   Repeat MRI 01/30/2025 demonstrating Worsening marrow edema of the talus, calcaneus, navicular, cuboid and cuneiforms with a possible stress fracture of the talar neck.  Surrounding regional soft tissue edema.  Mild FHL tenosynovitis.  Stable small ganglion cyst along the dorsal medial talar neck. No other significant abnormality identified.  Low testosterone being managed by endocrinology Dr. Toby Mendoza  Pain management, Dr. Mauricio Emery, plan for sympathetic blocks this week    PLAN:  Data:  I independently visualized xray images today  I reviewed available old/outside records  PT/OT:   deferred  Medications:   Per pain management.  Currently using lidocaine patches and taking pregabalin 150 mg a day  He cannot take steroids  vitamin D supplements   DME and weightbearing status:  He completed 6 weeks of non weightbearing.  Begin gradually increasing weight and boot   Compression socks hurt him worse   He has received a [facility] prescription in the past  Advanced imaging:  TBD  Referral:  Dr. Mauricio Emery plans for sympathetic blocks this week   To [employer] for handicap tag  Work/school/disability note/status:   No work until cleared by me.  He is anxious to get back to work but I do not want him to go back to early.  He thinks he is going to need to go into a long-term disability  Education:  I had a long discussion with the patient about the causes, treatments, and prognosis/natural history for  bony edema.  We discussed effective ways to improve symptoms as well as what types of activities may make the symptoms/prognosis worse.  We discussed signs and symptoms and other reasons to return to clinic sooner.  Return to clinic:  6-8 weeks  Images needed at next visit:  none    This note was generated with the assistance of ambient listening technology. Verbal consent was obtained by the patient and accompanying visitor(s) for the recording of patient appointment to facilitate this note. I attest to having reviewed and edited the generated note for accuracy, though some syntax or spelling errors may persist. Please contact the author of this note for any clarification.              Physician Signature: Inge Gómez M.D.       Official Website  Schedule An Appointment

## 2025-03-24 ENCOUNTER — PATIENT MESSAGE (OUTPATIENT)
Dept: PAIN MEDICINE | Facility: CLINIC | Age: 50
End: 2025-03-24
Payer: OTHER GOVERNMENT

## 2025-03-24 ENCOUNTER — OFFICE VISIT (OUTPATIENT)
Dept: ORTHOPEDICS | Facility: CLINIC | Age: 50
End: 2025-03-24
Payer: OTHER GOVERNMENT

## 2025-03-24 VITALS — WEIGHT: 199.94 LBS | BODY MASS INDEX: 28.69 KG/M2

## 2025-03-24 DIAGNOSIS — M25.571 RIGHT ANKLE PAIN, UNSPECIFIED CHRONICITY: Primary | ICD-10-CM

## 2025-03-24 DIAGNOSIS — R93.7 BONE MARROW EDEMA: ICD-10-CM

## 2025-03-24 PROCEDURE — 99213 OFFICE O/P EST LOW 20 MIN: CPT | Mod: PBBFAC | Performed by: ORTHOPAEDIC SURGERY

## 2025-03-24 PROCEDURE — 97760 ORTHOTIC MGMT&TRAING 1ST ENC: CPT | Mod: PBBFAC

## 2025-03-24 PROCEDURE — 99214 OFFICE O/P EST MOD 30 MIN: CPT | Mod: S$PBB,,, | Performed by: ORTHOPAEDIC SURGERY

## 2025-03-24 PROCEDURE — 99999 PR PBB SHADOW E&M-EST. PATIENT-LVL III: CPT | Mod: PBBFAC,,, | Performed by: ORTHOPAEDIC SURGERY

## 2025-03-26 ENCOUNTER — HOSPITAL ENCOUNTER (OUTPATIENT)
Facility: HOSPITAL | Age: 50
Discharge: HOME | End: 2025-03-26
Attending: ANESTHESIOLOGY | Admitting: ANESTHESIOLOGY
Payer: OTHER GOVERNMENT

## 2025-03-26 VITALS
TEMPERATURE: 97 F | HEART RATE: 76 BPM | SYSTOLIC BLOOD PRESSURE: 121 MMHG | HEIGHT: 70 IN | BODY MASS INDEX: 29.68 KG/M2 | OXYGEN SATURATION: 99 % | WEIGHT: 207.31 LBS | DIASTOLIC BLOOD PRESSURE: 75 MMHG | RESPIRATION RATE: 18 BRPM

## 2025-03-26 DIAGNOSIS — G90.521 COMPLEX REGIONAL PAIN SYNDROME TYPE 1 OF RIGHT LOWER EXTREMITY: Primary | ICD-10-CM

## 2025-03-26 DIAGNOSIS — G90.50 CRPS (COMPLEX REGIONAL PAIN SYNDROME TYPE I): ICD-10-CM

## 2025-03-26 PROCEDURE — 64520 N BLOCK LUMBAR/THORACIC: CPT | Mod: RT,,, | Performed by: ANESTHESIOLOGY

## 2025-03-26 PROCEDURE — 25500020 PHARM REV CODE 255: Performed by: ANESTHESIOLOGY

## 2025-03-26 PROCEDURE — 63600175 PHARM REV CODE 636 W HCPCS: Performed by: ANESTHESIOLOGY

## 2025-03-26 PROCEDURE — 64520 N BLOCK LUMBAR/THORACIC: CPT | Mod: RT | Performed by: ANESTHESIOLOGY

## 2025-03-26 RX ORDER — MIDAZOLAM HYDROCHLORIDE 1 MG/ML
INJECTION INTRAMUSCULAR; INTRAVENOUS
Status: DISCONTINUED | OUTPATIENT
Start: 2025-03-26 | End: 2025-03-26 | Stop reason: HOSPADM

## 2025-03-26 RX ORDER — ONDANSETRON HYDROCHLORIDE 2 MG/ML
4 INJECTION, SOLUTION INTRAVENOUS ONCE AS NEEDED
Status: DISCONTINUED | OUTPATIENT
Start: 2025-03-26 | End: 2025-03-26 | Stop reason: HOSPADM

## 2025-03-26 RX ORDER — FENTANYL CITRATE 50 UG/ML
INJECTION, SOLUTION INTRAMUSCULAR; INTRAVENOUS
Status: DISCONTINUED | OUTPATIENT
Start: 2025-03-26 | End: 2025-03-26 | Stop reason: HOSPADM

## 2025-03-26 RX ORDER — BUPIVACAINE HYDROCHLORIDE 5 MG/ML
INJECTION, SOLUTION EPIDURAL; INTRACAUDAL; PERINEURAL
Status: DISCONTINUED | OUTPATIENT
Start: 2025-03-26 | End: 2025-03-26 | Stop reason: HOSPADM

## 2025-03-26 NOTE — DISCHARGE SUMMARY
Discharge Note  Short Stay      SUMMARY     Admit Date: 3/26/2025    Attending Physician: Mauricio Emery MD        Discharge Physician: Mauricio Emery MD        Discharge Date: 3/26/2025 9:36 AM    Procedure(s) (LRB):  Right lumbar sympathetic block (Right)    Final Diagnosis: Complex regional pain syndrome type 1 of right lower extremity [G90.521]    Disposition: Home or self care    Patient Instructions:   Current Discharge Medication List        CONTINUE these medications which have NOT CHANGED    Details   LORazepam (ATIVAN) 0.5 MG tablet Take 0.5 mg by mouth daily as needed.      pregabalin (LYRICA) 50 MG capsule Take 1 capsule (50 mg total) by mouth 3 (three) times daily.  Qty: 90 capsule, Refills: 2    Associated Diagnoses: Right ankle pain, unspecified chronicity; Calcaneal bursitis, unspecified laterality      rizatriptan (MAXALT) 10 MG tablet Take 10 mg by mouth every 2 (two) hours as needed.      valsartan (DIOVAN) 40 MG tablet Take 40 mg by mouth every morning.      dextroamphetamine-amphetamine 10 mg Tab Take 1 tablet by mouth 2 (two) times daily.      LIDOcaine (LIDODERM) 5 % Place 1 patch onto the skin once daily. 12 hours on/12 hours off  Qty: 15 patch, Refills: 1      naproxen (NAPROSYN) 500 MG tablet Take 1 tablet (500 mg total) by mouth 2 (two) times daily. As needed with food.  Qty: 60 tablet, Refills: 0    Associated Diagnoses: Edema of right foot      omeprazole (PRILOSEC) 20 MG capsule Take 20 mg by mouth once daily.      promethazine (PHENERGAN) 25 MG tablet Take 1 tablet (25 mg total) by mouth every 6 (six) hours as needed (dizziness).  Qty: 20 tablet, Refills: 0    Associated Diagnoses: Nonintractable headache, unspecified chronicity pattern, unspecified headache type; Dizziness      silver sulfADIAZINE 1% (SILVADENE) 1 % cream Apply topically 2 (two) times daily.  Qty: 25 g, Refills: 0    Associated Diagnoses: Superficial burn of right hand including fingers, initial encounter                  Discharge Diagnosis: Complex regional pain syndrome type 1 of right lower extremity [G90.521]  Condition on Discharge: Stable with no complications to procedure   Diet on Discharge: Same as before.  Activity: as per instruction sheet.  Discharge to: Home with a responsible adult.  Follow up: 2-4 weeks       Please call the office at [phone] if you experience any weakness or loss of sensation, fever > 101.5, pain uncontrolled with oral medications, persistent nausea/vomiting/or diarrhea, redness or drainage from the incisions, or any other worrisome concerns. If physician on call was not reached or could not communicate with our office for any reason please go to the nearest emergency department

## 2025-03-26 NOTE — DISCHARGE INSTRUCTIONS

## 2025-03-26 NOTE — OP NOTE
Lumbar Sympathetic Block    INFORMED CONSENT: The procedure, risks, benefits and options were discussed with patient. There are no contraindications to the procedure. The patient expressed understanding and agreed to proceed. The personnel performing the procedure was discussed.    Date of procedure 03/26/2025    Time-out taken to identify patient and procedure side prior to starting the procedure.                     Operation:  Right  Lumbar Sympathetic Block    Pre Procedure diagnosis:    Complex regional pain syndrome type 1 of right lower extremity [G90.521]  1. Complex regional pain syndrome type 1 of right lower extremity    2. CRPS (complex regional pain syndrome type I)        Post-Procedure diagnosis:   same    Surgeon: Mauricio Emery MD    Assistants: None    Medication: 10 ml of Lidocaine 1% PF     Local: 3ml Lidocaine PF 1%    Sedation: Conscious sedation provided by M.D    SEDATION MEDICATIONS: local/IV sedation: Versed 3 mg and fentanyl 50 mcg IV.  Conscious sedation ordered by MD.  Patient reevaluated and sedation administered by MD and monitored by RN.  Total sedation time was less than 10 min.    Total sedation time was <10 min    Complications: None    Specimens: None      TECHNIQUE:  After written consent was obtained, the patient was placed in prone position on the x-ray table.  The area overlying the L3 vertebral body was identified using fluoroscopy in the AP view.  This was followed by prepping  and draping of the skin in a sterile fashion using chlorhexidine and then, marking approximately  7 cm from midline, superior to the transverse processes of L3 as designated entry points. From here, 1% lidocaine injected through a  27 gauge needle in a tract leading towards the vertebral body at L3 superior the transverse processes.  Then, there was incremental passage of a 7 inch 22 gauge spinal Quincke needles in stepwise fashion using fluoroscopic guidance for the needle tip in the AP and  lateral views with the tract going to the superior portion of the transverse process, running into the vertebral body, and being walked anteriorly along the vertebral body until the anterior portion of vertebral body was reached.      Then, there was injection of approximately 2mL radiopaque contrast dye, with spread of the contrast being visualized to go into the area of the lumbar sympathetic chain, confirming that the needle tips were in good position.  There was no evidence of blood with aspiration or paresthesias during this portion of the procedure. Then, after negative aspiration, the medication mixture above was injected with displacement of radiopaque contrast dye after injection of medication, confirming the medication went into the area of the lumbar sympathetic chain.  The patient tolerated this portion of the procedure well.      The patient was monitored for approximately 30 minutes after the procedure.  Patient was given post procedure and discharge instructions to follow at home.  The patient was discharged in a stable condition

## 2025-04-10 ENCOUNTER — TELEPHONE (OUTPATIENT)
Dept: ORTHOPEDICS | Facility: CLINIC | Age: 50
End: 2025-04-10
Payer: OTHER GOVERNMENT

## 2025-04-10 NOTE — TELEPHONE ENCOUNTER
[facility] paperwork sent. Will also attach to paperwork once scanned in.    Your fax has been successfully sent to [phone] at [phone].  ------------------------------------------------------------  From: [id number]  Matter: w jovi marrt  ------------------------------------------------------------  4/10/2025 2:15:26 PM Transmission Record          Sent to [phone] with remote ID "          Result: (0/339;0/0) Success          Page record: 1 - 11          Elapsed time: 05:35 on channel 15

## 2025-04-16 ENCOUNTER — PATIENT MESSAGE (OUTPATIENT)
Dept: PAIN MEDICINE | Facility: CLINIC | Age: 50
End: 2025-04-16
Payer: OTHER GOVERNMENT

## 2025-04-25 NOTE — PROGRESS NOTES
[address] [facility] [place] [address]   Phone [phone]  Fax [phone]           CHIEF COMPLAINT: Pain of the Right Ankle  HISTORY OF PRESENT ILLNESS JN (05/05/2025):  History of Present Illness    HPI:  Paulino presents for follow-up of right foot pain. Pain is located in the foot, with some discomfort on the side and possibly in the peroneal tendon area. He describes tightness and pain down the side. Current pain levels are 1-2 out of 10, primarily when walking. Pain is infrequent when sitting, though it may worsen slightly at night. He is unsure if the current pain is bone-related, suggesting it might be due to modified gait causing tightness. Swelling has reduced, and there is some pain on palpation, though not severe.    He reports improvement, stating he is making progress. He can now wear flat-bottomed shoes like Vans, Nabil Smiths, and Dunks all day with minimal issues. However, he has difficulty with shoes having steep arches or with a drop, which cause pressure on the top of his foot and impair his walking. Saucony shoes designed to be rigid cause discomfort. He can wear Villalobos shoes, which are more forgiving, but not all day.    He has returned to full-time work status, though not without issues. He expresses a desire to hike and engage in outdoor activities.    He underwent a sympathetic block with Dr. Emery, which was ineffective, helping to rule out Complex Regional Pain Syndrome (CRPS). He expresses relief at not having CRPS.    He reports negative experiences with pregabalin, including increased blood pressure and worsened anxiety. As a combat , he has had trouble with SSRIs in the past. He has been off pregabalin for nearly two weeks and feels he is returning to his normal self.    He denies pain when bringing his foot up towards himself and then outward.          Disclaimer: The history above was obtained through ambient listening technology thus may  contain errors.     HISTORY OF PRESENT ILLNESS JN (03/24/2025):  Paulino presents for follow-up of a right foot injury that occurred in October 2024. He describes the initial injury as a significant roll but admits to potentially burying or not focusing on pain at the time. He states that even if there was pain, he does not remember it, but he does remember the event that he thought was going to lead to bruising.    He is currently at the six-week gracie of non-weight bearing treatment. He has been using an iWalk, which he prefers over crutches, and a scooter for longer distances. Over the past week, he has started putting weight on the affected foot, reporting that it felt better. This coincided with an increase in his pregabalin dose from 100 to 150 mg daily.    His right glute muscle has gotten much bigger than his left side, while his calf has gotten smaller. Certain motions can cause pain. He reports severe pain earlier in his recovery, stating he had never woken up in the middle of the night in that much pain. Recently, he has noticed difficulty with shoes that have an arch, finding flat shoes like Vans or [place] more comfortable to walk in. He has also been able to walk in [facility] sandals.    He reports brain fog and palpitations with the increased medication, though he acknowledges this could be related to anxiety. He is nervous about an upcoming nerve block scheduled for Wednesday, relating it to anxiety stemming from a C5 fracture he experienced in 1998.    Paulino expresses concern about the uncertainty of his diagnosis, mentioning that he has resumed seeing his neuropsychologist due to the stress of the situation. He states that the uncertainty is affecting him significantly.    Paulino denies having CRPS, though it has been discussed as a potential diagnosis.          Disclaimer: The history above was obtained through ambient listening technology thus may contain errors.     HISTORY OF PRESENT ILLNESS  JN (02/10/2025):  Paulino presents for  MRI review andfollow-up regarding ongoing foot pain. An MRI shows worsening bone bruising in the talus and navicular bones compared to a previous scan. Pain has been severe. Paulino reports improvement since using crutches and avoiding weight-bearing for about a week. His free testosterone was slightly low about six months ago but within normal range.    Recently, patient had pneumonia, describing it as extremely painful. Paulino states it started as flu and then aspiration occurred. Paulino was incapacitated for approximately six days.    Paulion has been prescribed lidocaine patches and Lyrica, which was intended to replace gabapentin and cause less drowsiness. However, patient reports only taking Lyrica once at night instead of the prescribed twice daily due to sleepiness. Paulino expresses concern about the possibility of a fracture and mentions having PTSD, stating that idleness exacerbates PTSD symptoms. Paulino has already contacted psychologist regarding this concern.    Paulino denies being super sensitive to touch. Paulino denies having complex regional pain syndrome.    PREVIOUS TREATMENTS:  - Lidocaine patches: Currently using  - Lyrica: Taking once at night instead of prescribed twice daily, due to sleepiness  - Gabapentin: Previously taken, caused sleepiness  - Crutches: Used for about a week to avoid weight-bearing on the affected foot    MEDICATIONS:  - Lyrica: Once at night, sleepiness  - Lidocaine patches          HISTORY OF PRESENT ILLNESS VAZQUEZ (05/05/2025):    Paulino presents with right foot pain and swelling from an injury in October. Pain has been worsening, described as severe enough to wake him from sleep. He reports taking up to 3200 mg of ibuprofen daily for pain management, stating that he cannot walk without medication due to the severe pain. Ice therapy 3-4 times daily and a TENS machine 1-2 times daily provide minimal relief. He also uses Voltaren  gel, an anti-inflammatory topical treatment which he reports also does not work    An MRI two months ago revealed bone bruising, soft tissue swelling, and cysts in the foot. Paulino has been taking high doses of Vitamin D (up to 20,000 IUs daily) and K2 supplements. He sometimes experiences itching in the affected area, which he attributes to the swelling.    He expresses frustration with the slow healing process and the impact on his daily life, particularly his ability to work. He states that this injury has been more challenging to manage than previous significant injuries. His medical history includes a C5 fracture, major hip surgery, and knee surgery.    Paulino denies sheets or light touch bothering him at night.  The sheets on his bed do not bother him    PREVIOUS TREATMENTS:  - Ice therapy: 3-4 times daily, minimal relief  - TENS machine: 1-2 times daily, minimal relief  - Voltaren gel: Anti-inflammatory topical treatment    MEDICATIONS:  - Ibuprofen: 3200 mg daily since October  - Omeprazole: daily  - Vitamin D: 20,000 IU daily with K2  - Naproxen: alternative to ibuprofen    SURGICAL HISTORY:  - Hip surgery  - Knee surgery  - C5 fracture            HISTORY OF PRESENT ILLNESS (01/06/2025):  Paulino presents with ongoing foot and ankle issues, experiencing swelling in his ankle that has been moving to different areas. He reports tingling on the bottom of his foot and side of his toes. Paulino notes significant difficulty walking two days ago. The swelling is primarily in the lateral ankle area.    He mentions having seven anchors in his hip, which causes discomfort. He tried wearing a boot previously, which helped initially, but stopped using it when he returned to work. He explains that he is a  and finds it difficult to be inactive.    Paulino reports taking vitamin D3 K2 supplements, about 10,000 IU a day. 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    He mentions having rolled his ankle in October, which may be the cause of his current issues. He expresses concern about the possibility of cancer.    Paulino reports experiencing pain in the underneath part of the ankle and heel, which he associates with bone bruising. He mentions having had this before but notes that the current issues are more in the upper part of his foot and ankle. Paulino also mentions having a cyst in his ankle.    Paulino's medical history includes right knee surgery, seven anchors in his hip, and shoulder surgery for an impingement where part of his clavicle was removed. He attributes some of these issues to his  service, particularly carrying gear on his right side.    Paulino denies pain over a specific area when asked about his hip. Paulino denies any medical history of cancer.    MEDICATIONS:  - Vitamin D3 K2: 10,000 IU daily    SURGICAL HISTORY:  - Right knee surgery  - Hip surgery: seven anchors placed  - Shoulder surgery: part of the clavicle bone (about an inch) removed due to an impingement          HISTORY OF PRESENT ILLNESS (12/10/2024):  Paulino presents FOR AN MRI REVIEW with ankle pain that started about a month and a half ago in October after rolling his ankle, which felt different from previous ankle rolls. He states that this time it felt different. The pain is significantly better than it was, but he believes he's starting to compensate, causing pain underneath the ankle. He reports some improvement with ibuprofen use but expresses frustration with his limited ability to engage in activities.    He mentions feeling occasional pressure and pain on the top of his foot, which he believes might be related to a cyst identified on the MRI. He has a history of low vitamin D levels and is concerned about his ability to lift weights and engage in other activities. He describes wearing various types  "of shoes, including Nikes, Jordans, Air [employer] Ones, and Vans, and mentions using Superfeet inserts.    The prescribed meloxicam is not effective for pain relief, with the patient stating its efficacy diminishes about care home through the day. He denies any concerns for cancer, medical history of diabetes, hypertension, or other chronic conditions.    MEDICATIONS:  - Meloxicam: Not effective, especially by care home through the day  - Vitamin D: History of low levels  - Ibuprofen: Provides much more relief          HISTORY OF PRESENT ILLNESS (11/22/2024):  HPI:  Paulino presents for follow-up regarding an ankle injury. He reports improvement, stating that it's significantly better than before and he can now walk without much of a limp. He has been wearing a boot, which he believes has helped. He is currently taking meloxicam for pain management.    He reports ongoing pain in a specific area of the ankle, with a particular incident of pain waking him up the previous night. He describes the pain as feeling like "a pair of needles." Paulino typically experiences pain when flexing his foot, and by the end of the day as the meloxicam starts to wear off. He's experiencing some pain, but it's significantly less than when he first started treatment.    The initial injury occurred when he turned his ankle at home, but he mentions experiencing similar incidents in the past without significant issues. He expresses concern about the duration of this particular episode, stating that it has taken longer to heal than any previous incidents.    Paulino denies significant pain when moving his foot outward or holding it in that position. Paulino denies any visible bruising from the initial injury.            HISTORY OF PRESENT ILLNESS (11/15/2024):    Paulino presents with right lateral hindfoot pain pain that has persisted for at least three weeks and has progressively worsened. He localizes the pain to the lateral aspect of his foot. " He recalls rolling his ankle at home but cannot pinpoint a specific injury as the cause. He reports playing basketball and having a long  career, noting it's not uncommon for him to experience foot pain, but states this episode is unusually prolonged.    He describes swelling in his ankle, which he can feel near the Achilles tendon. He mentions taking a significant amount of ibuprofen to manage daily activities, which is atypical for him. He reports feeling pressure throughout his foot with movement, describing it as a sensation of swelling without acute pain.    His activity level has decreased due to the foot pain.  He has never had gout. He cannot take steroid Dosepaks due to issues with his blood pressure.    He denies recalling a specific injury to his foot. He denies having diabetes or a history of gout.    He has taken ibuprofen for pain management.     SURGICAL HISTORY:  - Hip surgery: resulted in cessation of running  - Left knee meniscus repair: Approximately 3 months after hip surgery  - Shoulder surgery: Last year    SOCIAL HISTORY:  - Long  career  - Right-sided body issues due to  service  - Previously ran 40-50 miles per week          PAST MEDICAL HISTORY:    Past Medical History:   Diagnosis Date    Anxiety     Chronic post-traumatic stress disorder (PTSD)     GERD (gastroesophageal reflux disease)     HTN (hypertension)        Hemoglobin A1C   Date Value Ref Range Status   08/01/2024 5.7 (H) 4.8 - 5.6 % Final     Comment:              Prediabetes: 5.7 - 6.4           Diabetes: >6.4           Glycemic control for adults with diabetes: <7.0        PAST SURGICAL HISTORY:    Past Surgical History:   Procedure Laterality Date    APPENDECTOMY      HIP ARTHROPLASTY      KNEE ARTHROPLASTY      LUMBAR SYMPATHETIC NERVE BLOCK Right 3/26/2025    Procedure: Right lumbar sympathetic block;  Surgeon: Mauricio Emery MD;  Location: [facility];  Service: Pain Management;  Laterality:  Right;        MEDICATIONS:    Current Outpatient Medications:     dextroamphetamine-amphetamine 10 mg Tab, Take 1 tablet by mouth 2 (two) times daily., Disp: , Rfl:     LIDOcaine (LIDODERM) 5 %, Place 1 patch onto the skin once daily. 12 hours on/12 hours off, Disp: 15 patch, Rfl: 1    LORazepam (ATIVAN) 0.5 MG tablet, Take 0.5 mg by mouth daily as needed., Disp: , Rfl:     naproxen (NAPROSYN) 500 MG tablet, Take 1 tablet (500 mg total) by mouth 2 (two) times daily. As needed with food., Disp: 60 tablet, Rfl: 0    omeprazole (PRILOSEC) 20 MG capsule, Take 20 mg by mouth once daily., Disp: , Rfl:     promethazine (PHENERGAN) 25 MG tablet, Take 1 tablet (25 mg total) by mouth every 6 (six) hours as needed (dizziness)., Disp: 20 tablet, Rfl: 0    rizatriptan (MAXALT) 10 MG tablet, Take 10 mg by mouth every 2 (two) hours as needed., Disp: , Rfl:     silver sulfADIAZINE 1% (SILVADENE) 1 % cream, Apply topically 2 (two) times daily., Disp: 25 g, Rfl: 0    valsartan (DIOVAN) 40 MG tablet, Take 80 mg by mouth every morning., Disp: , Rfl:     pregabalin (LYRICA) 50 MG capsule, Take 1 capsule (50 mg total) by mouth 3 (three) times daily. (Patient not taking: Reported on 5/5/2025), Disp: 90 capsule, Rfl: 2     There are no discontinued medications.      ALLERGIES:     Review of patient's allergies indicates:   Allergen Reactions    Medrol [methylprednisolone] Anxiety            FAMILY HISTORY:   Family History   Problem Relation Name Age of Onset    Thrombosis Neg Hx             SOCIAL HISTORY:    Social History     Socioeconomic History    Marital status:    Tobacco Use    Smoking status: Every Day     Types: Vaping with nicotine    Smokeless tobacco: Former   Substance and Sexual Activity    Alcohol use: Yes     Alcohol/week: 2.0 standard drinks of alcohol     Types: 2 Cans of beer per week     Comment: rarely    Drug use: Never     Social Drivers of Health     Financial Resource Strain: Medium Risk (2/19/2025)     "Overall Financial Resource Strain (CARDIA)     Difficulty of Paying Living Expenses: Somewhat hard   Food Insecurity: No Food Insecurity (2/19/2025)    Hunger Vital Sign     Worried About Running Out of Food in the Last Year: Never true     Ran Out of Food in the Last Year: Never true   Transportation Needs: No Transportation Needs (2/19/2025)    PRAPARE - Transportation     Lack of Transportation (Medical): No     Lack of Transportation (Non-Medical): No   Physical Activity: Inactive (2/19/2025)    Exercise Vital Sign     Days of Exercise per Week: 0 days     Minutes of Exercise per Session: 0 min   Stress: Stress Concern Present (2/19/2025)    [employer] - Occupational Stress Questionnaire     Feeling of Stress : Very much   Housing Stability: High Risk (2/19/2025)    Housing Stability Vital Sign     Unable to Pay for Housing in the Last Year: Yes     Number of Times Moved in the Last Year: 0     Homeless in the Last Year: No         PHYSICAL EXAMINATION:     Estimated body mass index is 28.7 kg/m² as calculated from the following:    Height as of 4/29/25: 5' 10" (1.778 m).    Weight as of this encounter: 90.7 kg (200 lb).   ASSISTIVE DEVICE:  Normal shoes  Ankle Exam (right extremity):   Inspection:         Gross deformity   (-)         Erythema   (-)        Ecchymosis   (-)          Swelling   (+) mildly swollen over right lateral and medial hindfoot      Open wounds   (-)         Surgical scars   (-)                    Palpation tenderness:  Bony:  Hindfoot:  Proximal fibula  (-)  Calcaneus  (+)   Distal fibula  (-)  Talus   (+) with deep palpation  Medial malleolus  (-)  CC joint/cuboid  (+) with deep palpation  Tibiotalar joint  (-)  Lateral gutter  (-)  Plantar fascia  (-)  Medial gutter  (-)   Midfoot:   TN joint   (-)   NC joints   (-)   TMT joints   (-)   Forefoot:   (-)      Soft tissue:     Tendons:    Achilles midsubstance (-)  Peroneal tendons  (+)   Achilles " insertion  (-)  Posterior tibial tendon (-)   Retrocalcaneal bursa (-)  Anterior tibial tendon  (-)   Ligaments:   ATFL   (-)  Deltoid   (-)   CFL   (-)  Syndesmosis  (-)  ROM:  Affected Ankle:         DF:    10°        PF:    40°                 Pain    (+)       Crepitance   (-)       Sensory:  Normal sensation to light touch in Sa/Cordon/DP/SP/T nerve distributions      Motor:               Fires EHL/FHL/Tibialis anterior/Gastrocsoleus   Vascular:  Foot WWP with brisk capillary refill    DP/PT pulses palpable  Special Tests:  Montes   (-)  Anterior drawer   (-)  Calcaneal squeeze  (+)  Syndesmotic squeeze  (-)  He does not have overt sensitivity to touching      IMAGING:      FL Fluoro for Pain Management  See OP Notes for results.     IMPRESSION: See OP Notes for results.     This procedure was auto-finalized by: Virtual Radiologist           ASSESSMENT: 50 y.o. male  with:   Right diffuse hindfoot swelling with majority of pain centered over peroneals present since October 20, 2024 after a trauma with MRI on 12/06/2024 demonstrating:  Bilateral marrow edema in the anterior process of the calcaneus and sustentaculum alyson without fracture.  This may be due to stress response., Small calcaneal cuboid joint effusion, Synovial cyst arising the dorsal margin of the talonavicular joint measuring 2.5 x 1.4 x 1.3 cm,  Intact ligaments and tendons.  Can not take steroids due elevations blood pressure caused by steroids  Normal vitamin-D  Negative rheumatoid factor and GIOVANNI   Repeat MRI 01/30/2025 demonstrating Worsening marrow edema of the talus, calcaneus, navicular, cuboid and cuneiforms with a possible stress fracture of the talar neck.  Surrounding regional soft tissue edema.  Mild FHL tenosynovitis.  Stable small ganglion cyst along the dorsal medial talar neck. No other significant abnormality identified.  Low testosterone being managed by endocrinology Dr. Toby Mendoza  Pain management, Dr. Mauricio Emery,  underwent a sympathetic block  which was ineffective, helping to rule out Complex Regional Pain Syndrome (CRPS).     PLAN:  Data:  I independently visualized xray images today  I reviewed available old/outside records  PT/OT:  Schedule: 1-2 times a week for 6 weeks. Ordered with focus on reducing pain/inflammation and improving strength, ROM, and function.  Home exercise program will also be implemented to maintain and improve upon the progress made during therapies.  We will schedule around a PT as he is having trouble with his gait  Medications:   Per pain management.  Currently using lidocaine patches.  Stopped taking pregabalin 150 mg a day  He cannot take steroids  vitamin D supplements   DME and weightbearing status:  He completed 6 weeks of non weightbearing.  Tennis shoes of choice as he has particular about his shoes   Compression socks hurt him worse  Advanced imaging:  TBD  Referral:  Received handicap tag  Work/school/disability note/status:  He has been working  Education:  I had a long discussion with the patient about the causes, treatments, and prognosis/natural history for  bony edema.  We discussed effective ways to improve symptoms as well as what types of activities may make the symptoms/prognosis worse. We discussed signs and symptoms and other reasons to return to clinic sooner.  Return to clinic:  2 months  Images needed at next visit:  none    This note was generated with the assistance of ambient listening technology thus some errors may exist.  Please contact the author of this note for any clarification.           Physician Signature: Inge Gómez M.D.       Official Website  Schedule An Appointment

## 2025-04-28 ENCOUNTER — TELEPHONE (OUTPATIENT)
Dept: PAIN MEDICINE | Facility: CLINIC | Age: 50
End: 2025-04-28
Payer: OTHER GOVERNMENT

## 2025-04-29 ENCOUNTER — OFFICE VISIT (OUTPATIENT)
Dept: PAIN MEDICINE | Facility: CLINIC | Age: 50
End: 2025-04-29
Payer: OTHER GOVERNMENT

## 2025-04-29 VITALS
WEIGHT: 206.13 LBS | BODY MASS INDEX: 29.51 KG/M2 | DIASTOLIC BLOOD PRESSURE: 84 MMHG | RESPIRATION RATE: 17 BRPM | HEIGHT: 70 IN | SYSTOLIC BLOOD PRESSURE: 137 MMHG | HEART RATE: 96 BPM

## 2025-04-29 DIAGNOSIS — M77.50 CALCANEAL BURSITIS, UNSPECIFIED LATERALITY: ICD-10-CM

## 2025-04-29 DIAGNOSIS — M25.571 RIGHT ANKLE PAIN, UNSPECIFIED CHRONICITY: ICD-10-CM

## 2025-04-29 DIAGNOSIS — G90.521 COMPLEX REGIONAL PAIN SYNDROME TYPE 1 OF RIGHT LOWER EXTREMITY: Primary | ICD-10-CM

## 2025-04-29 DIAGNOSIS — R93.7 BONE MARROW EDEMA: ICD-10-CM

## 2025-04-29 PROCEDURE — 99213 OFFICE O/P EST LOW 20 MIN: CPT | Mod: PBBFAC,PN | Performed by: ANESTHESIOLOGY

## 2025-04-29 PROCEDURE — G2211 COMPLEX E/M VISIT ADD ON: HCPCS | Mod: S$PBB,,, | Performed by: ANESTHESIOLOGY

## 2025-04-29 PROCEDURE — 99999 PR PBB SHADOW E&M-EST. PATIENT-LVL III: CPT | Mod: PBBFAC,,, | Performed by: ANESTHESIOLOGY

## 2025-04-29 PROCEDURE — 99213 OFFICE O/P EST LOW 20 MIN: CPT | Mod: S$PBB,,, | Performed by: ANESTHESIOLOGY

## 2025-04-29 NOTE — PROGRESS NOTES
Interventional Pain Progress Note       Referring Physician: No ref. provider found    PCP: No, Primary Doctor    Chief Complaint:     Chief Complaint   Patient presents with    Foot Pain     Patient received 0% relief from injection.  Patient has pain in the crease of top of right foot.  Pain level 3/10        SUBJECTIVE:    Interval History (03/21/2025):     Patient returns to clinic after procedure.  Patient reports no relief after right lumbar sympathetic block on 03/26/2025.  Patient also reports that one-week ago he discontinued his pregabalin medication as he noted an increase in his anger with the medication.  Patient reports that over the last 2 weeks he has not noticed improvement in his right foot pain.  Patient reports that he is now able to perform more activities without consistent and severe pain.  Patient does report continued night disturbances.  Pain is currently rated a 3 out 10. Denies any fevers, chills, changes in gait, saddle anesthesia, or bowel and bladder incontinence          Interval History (03/06/2025):      Patient returns to clinic for evaluation of right foot pain.  Since last being seen, patient reports continued right foot pain primarily of the lateral aspect in his right ankle and right foot.  Patient reports intermittent burning sensations.  Patient denies any extension of the pain to his thigh.  Patient does report moderate swelling, but denies any significant allodynia, skin changes, or changes in sweat production.  Pain is currently rated a 3 out 10, but can increase to a 7/10 with exacerbating activity.  Patient is currently nonweightbearing to the right foot per Orthopedics.  Patient is also being evaluated by Endocrinology for bone metabolism and low testosterone      Initial HPI:     Paulino Marcial is a 50 y.o. male who presents to the clinic for the evaluation of right pain.   Patient reports 4 month history of right ankle and right foot pain.  Patient reports a series of  minor injuries to his right ankle throughout his life between playing sports and serving in the .  Patient reports that inciting injury start when he rolled his ankle 4 months ago.  Patient denies any previous procedures on his right ankle.  Patient has had previous right hip arthroplasty  Pain described as stabbing aching pain over the medial and lateral malleolus as well as the plantar aspect of the foot primarily over the he will.  Patient reports this pain will radiate up the posterior aspect of his right lower extremity to the mid calf.  Patient denies any radiation extending above his right knee.  Patient denies any radiation to his toes.  Patient denies any significant left foot pain.  Patient does not recall any numbness or tingling with the pain.  Pain is worse with bearing weight to his right foot, better with sitting down and elevation in his right lower extremity.  Pain is currently rated a 4/10, but can increase to a 9/10 with exacerbating activity.  Patient denies any fevers, chills, saddle anesthesia, or bowel and bladder incontinence.      Non-Pharmacologic Treatments:  Physical Therapy/Home Exercise: yes  Ice/Heat:yes  TENS: no  Acupuncture: no  Massage: no  Chiropractic: no        Previous Pain Medications:  Tylenol, ibuprofen, Flexeril, meloxicam, naproxen     report:  Reviewed and consistent with medication use as prescribed.    Pain Procedures:   - 03/26/2025:  Right lumbar sympathetic block, 0% relief    Pain Disability Index Review:         4/29/2025     9:29 AM 3/6/2025     8:09 AM 1/29/2025    12:07 PM   Last 3 PDI Scores   Pain Disability Index (PDI) 32 [phone] [id number]       Imaging:      MRI ANKLE WITHOUT CONTRAST RIGHT 12/06/2024     CLINICAL HISTORY: Right ankle pain     TECHNIQUE: Multiplanar multisequence imaging of the right ankle is performed without intravenous contrast.     FINDINGS:  There is a prominent anterior calcaneal process.  There is marked bone marrow edema in the  anterior process of the calcaneus and sustentaculum alyson without fracture.  There is a small calcaneal cuboid joint effusion.  There is a synovial cyst arising from the dorsal margin of the talonavicular joint measuring 2.5 x 1.3 x 1.4 cm.  There is no fracture or avascular necrosis.  No tibiotalar joint effusion.     The plantar fascia is normal.  The Achilles tendon, peroneal tendons, and tendons the anterior and medial compartment are intact.  The high ankle ligaments, deltoid ligament, spring ligament, ATFL, posterior talofibular ligament, and calcaneofibular ligament are all intact.  The Lisfranc ligament is intact.        Impression:     1.  Bilateral marrow edema in the anterior process of the calcaneus and sustentaculum alyson without fracture.  This may be due to stress response.  2.  Small calcaneal cuboid joint effusion.  3.  Synovial cyst arising the dorsal margin of the talonavicular joint measuring 2.5 x 1.4 x 1.3 cm.  4.  Intact ligaments and tendons.    Past Medical History:   Diagnosis Date    Anxiety     Chronic post-traumatic stress disorder (PTSD)     GERD (gastroesophageal reflux disease)     HTN (hypertension)      Past Surgical History:   Procedure Laterality Date    APPENDECTOMY      HIP ARTHROPLASTY      KNEE ARTHROPLASTY      LUMBAR SYMPATHETIC NERVE BLOCK Right 3/26/2025    Procedure: Right lumbar sympathetic block;  Surgeon: Mauricio Emery MD;  Location: [facility];  Service: Pain Management;  Laterality: Right;     Social History     Socioeconomic History    Marital status:    Tobacco Use    Smoking status: Every Day     Types: Vaping with nicotine    Smokeless tobacco: Former   Substance and Sexual Activity    Alcohol use: Yes     Alcohol/week: 2.0 standard drinks of alcohol     Types: 2 Cans of beer per week     Comment: rarely    Drug use: Never     Social Drivers of Health     Financial Resource Strain: Medium Risk (2/19/2025)    Overall Financial Resource Strain (CARDIA)      Difficulty of Paying Living Expenses: Somewhat hard   Food Insecurity: No Food Insecurity (2/19/2025)    Hunger Vital Sign     Worried About Running Out of Food in the Last Year: Never true     Ran Out of Food in the Last Year: Never true   Transportation Needs: No Transportation Needs (2/19/2025)    PRAPARE - Transportation     Lack of Transportation (Medical): No     Lack of Transportation (Non-Medical): No   Physical Activity: Inactive (2/19/2025)    Exercise Vital Sign     Days of Exercise per Week: 0 days     Minutes of Exercise per Session: 0 min   Stress: Stress Concern Present (2/19/2025)    [employer] [place] [employer] - Occupational Stress Questionnaire     Feeling of Stress : Very much   Housing Stability: High Risk (2/19/2025)    Housing Stability Vital Sign     Unable to Pay for Housing in the Last Year: Yes     Number of Times Moved in the Last Year: 0     Homeless in the Last Year: No     Family History   Problem Relation Name Age of Onset    Thrombosis Neg Hx         Review of patient's allergies indicates:   Allergen Reactions    Medrol [methylprednisolone] Anxiety       Current Outpatient Medications   Medication Sig    dextroamphetamine-amphetamine 10 mg Tab Take 1 tablet by mouth 2 (two) times daily.    LIDOcaine (LIDODERM) 5 % Place 1 patch onto the skin once daily. 12 hours on/12 hours off    LORazepam (ATIVAN) 0.5 MG tablet Take 0.5 mg by mouth daily as needed.    naproxen (NAPROSYN) 500 MG tablet Take 1 tablet (500 mg total) by mouth 2 (two) times daily. As needed with food.    omeprazole (PRILOSEC) 20 MG capsule Take 20 mg by mouth once daily.    promethazine (PHENERGAN) 25 MG tablet Take 1 tablet (25 mg total) by mouth every 6 (six) hours as needed (dizziness).    rizatriptan (MAXALT) 10 MG tablet Take 10 mg by mouth every 2 (two) hours as needed.    silver sulfADIAZINE 1% (SILVADENE) 1 % cream Apply topically 2 (two) times daily.    valsartan (DIOVAN) 40 MG tablet Take 40  "mg by mouth every morning.    pregabalin (LYRICA) 50 MG capsule Take 1 capsule (50 mg total) by mouth 3 (three) times daily. (Patient not taking: Reported on 4/29/2025)     No current facility-administered medications for this visit.         ROS  Review of Systems   Constitutional:  Negative for activity change, appetite change and fever.   Respiratory:  Negative for cough, chest tightness, shortness of breath, wheezing and stridor.    Cardiovascular:  Negative for chest pain and palpitations.   Gastrointestinal:  Negative for abdominal pain, blood in stool, constipation, diarrhea, nausea and vomiting.   Endocrine: Negative for polydipsia, polyphagia and polyuria.   Genitourinary:  Negative for dysuria, hematuria and urgency.   Musculoskeletal:  Positive for arthralgias, gait problem and myalgias. Negative for back pain, joint swelling, neck pain and neck stiffness.   Skin:  Negative for rash.   Allergic/Immunologic: Negative for food allergies.   Neurological:  Positive for weakness. Negative for dizziness, tremors, seizures, syncope, facial asymmetry, speech difficulty, light-headedness, numbness and headaches.   Psychiatric/Behavioral:  Negative for agitation, hallucinations, self-injury and suicidal ideas. The patient is not nervous/anxious and is not hyperactive.             OBJECTIVE:  /84   Pulse 96   Resp 17   Ht 5' 10" (1.778 m)   Wt 93.5 kg (206 lb 2.1 oz)   BMI 29.58 kg/m²         Physical Exam  Constitutional:       General: He is not in acute distress.     Appearance: Normal appearance. He is not ill-appearing.   HENT:      Head: Normocephalic and atraumatic.   Eyes:      Extraocular Movements: Extraocular movements intact.      Pupils: Pupils are equal, round, and reactive to light.   Pulmonary:      Effort: Pulmonary effort is normal.   Musculoskeletal:      Right foot: Decreased range of motion. Normal capillary refill. Swelling, tenderness and bony tenderness present. No crepitus. Normal " pulse.      Left foot: Normal.      Comments: Tenderness to palpation over the lateral and medial malleolus as well as the Achilles tendon.  Mild swelling over the lateral malleolus.  No allodynia, skin changes, or sweat production changes present   Skin:     General: Skin is warm and dry.      Capillary Refill: Capillary refill takes less than 2 seconds.   Neurological:      General: No focal deficit present.      Mental Status: He is alert and oriented to person, place, and time.      Sensory: No sensory deficit.   Psychiatric:         Mood and Affect: Mood normal.         Behavior: Behavior normal.         Thought Content: Thought content normal.               LABS:  Lab Results   Component Value Date    WBC 7.3 08/01/2024    HGB 15.9 08/01/2024    HCT 47.8 08/01/2024    MCV 87 05/09/2023     08/01/2024       CMP  Sodium   Date Value Ref Range Status   02/21/2025 138 136 - 145 mmol/L Final     Potassium   Date Value Ref Range Status   02/21/2025 4.5 3.5 - 5.1 mmol/L Final     Chloride   Date Value Ref Range Status   02/21/2025 104 95 - 110 mmol/L Final     CO2   Date Value Ref Range Status   02/21/2025 24 23 - 29 mmol/L Final     Glucose   Date Value Ref Range Status   02/21/2025 96 70 - 110 mg/dL Final     BUN   Date Value Ref Range Status   02/21/2025 12 6 - 20 mg/dL Final     Creatinine   Date Value Ref Range Status   02/21/2025 0.9 0.5 - 1.4 mg/dL Final     Calcium   Date Value Ref Range Status   02/21/2025 9.6 8.7 - 10.5 mg/dL Final     Total Protein   Date Value Ref Range Status   02/21/2025 7.0 6.0 - 8.4 g/dL Final     Albumin   Date Value Ref Range Status   02/21/2025 4.1 3.5 - 5.2 g/dL Final   02/21/2025 4.5 3.6 - 5.1 g/dL Final     Comment:     Test Performed at:  [facility]  [id number] [place] [address]  [id number]     TATUM Prince MD, PhD, PETER       Total Bilirubin   Date Value Ref Range Status   02/21/2025 0.6 0.1 - 1.0 mg/dL Final     Comment:     For  infants and newborns, interpretation of results should be based  on gestational age, weight and in agreement with clinical  observations.    Premature Infant recommended reference ranges:  Up to 24 hours.............<8.0 mg/dL  Up to 48 hours............<12.0 mg/dL  3-5 days..................<15.0 mg/dL  6-29 days.................<15.0 mg/dL       Alkaline Phosphatase   Date Value Ref Range Status   02/21/2025 71 40 - 150 U/L Final     AST   Date Value Ref Range Status   02/21/2025 28 10 - 40 U/L Final     ALT   Date Value Ref Range Status   02/21/2025 19 10 - 44 U/L Final     Anion Gap   Date Value Ref Range Status   02/21/2025 10 8 - 16 mmol/L Final       Lab Results   Component Value Date    HGBA1C 5.7 (H) 08/01/2024             ASSESSMENT:       50 y.o. year old male with right foot pain, consistent with     1. Complex regional pain syndrome type 1 of right lower extremity        2. Bone marrow edema        3. Right ankle pain, unspecified chronicity        4. Calcaneal bursitis, unspecified laterality            Complex regional pain syndrome type 1 of right lower extremity    Bone marrow edema    Right ankle pain, unspecified chronicity    Calcaneal bursitis, unspecified laterality               PLAN:   - Interventions:   -  None at this time.  Based on patient's imaging, symptoms, and lack of response to neuropathic pain medications and sympathetic block, patient is right foot pain appears to be more consistent with underlying musculoskeletal pathology as compared to CRPS.  Patient does have redness and swelling as well as pain upper portion of the right foot, but he does not have any significant allodynia or hair changes.  Patient also reports that over the last 2-3 weeks his right foot pain has improved.  - Patient is currently being evaluated by Endocrinology for abnormal bone metabolism in low testosterone.   - it diagnosis looks more consistent with CRPS can consider dorsal root ganglion stimulation  versus peripheral nerve stimulation.    - 03/26/2025:  Right lumbar sympathetic block, 0% relief    - Anticoagulation use:   No no anticoagulation    - Medications:  - pregabalin medication discontinued as patient reports significant mood changes with the medication  - continue compound cream as patient reports improvement with Lidoderm patches.  Patient will continue Lidoderm patches until compound cream was delivered to his house  - continue naproxen 500 mg twice a day    - Therapy:   - patient has completed 8 weeks of formal physical therapy with the last 3 months with continued pain.  Continue home exercises   - continue footwear recommendations from orthopedics.  Significant for bilateral marrow edema over the anterior process of the calcaneus without fracture    - Imaging/Diagnostic:  - updated MRI of right ankle reveals worsening of marrow edema of right talus, calcaneus, navicular, cuboid, and cuneiform with possible stress fracture talar neck      - Consults:   - continue follow up with Orthopedics.  Patient currently nonweightbearing to right ankle  - continue follow up with Endocrinology.  Patient is currently being evaluated for abnormal bone metabolism and low testosterone      - Patient Questions: Answered all of the patient's questions regarding diagnosis, therapy, and treatment     This condition does not require this patient to take time off of work, and the primary goal of our Pain Management services is to improve the patient's functional capacity.     - Follow up visit: return to clinic after evaluation by orthopedic    Visit today included increased complexity associated with the care of the episodic problem of chronic pain which was addressed and continue to manage the longitudinal care of the patient due to the serious and/or complex managed problem(s) listed above.      The above plan and management options were discussed at length with patient. Patient is in agreement with the above and  verbalized understanding.    I discussed the goals of interventional chronic pain management with the patient on today's visit.  I explained the utility of injections for diagnostic and therapeutic purposes.  We discussed a multimodal approach to pain including treating the patient's given worst pain at any given time.  We will use a systematic approach to addressing pain.  We will also adopt a multimodal approach that includes injections, adjuvant medications, physical therapy, at times psychiatry.  There may be a limited role for opioid use intermittently in the treatment of pain, more particularly for acute pain although no one approach can be used as a sole treatment modality.    I emphasized the importance of regular exercise, core strengthening and stretching, diet and weight loss as a cornerstone of long-term pain management.      Mauricio Emery MD  Interventional Pain Management  [facility]    Future Appointments   Date Time Provider Department Center   5/5/2025  8:20 AM Inge Gómez MD [facility]           Disclaimer:  This note was prepared using voice recognition system and is likely to have sound alike errors that may have been overlooked even after proof reading.  Please call me with any questions      I spent a total of 20 minutes on the day of the visit.  This includes face to face time and non-face to face time preparing to see the patient (eg, review of tests), obtaining and/or reviewing separately obtained history, documenting clinical information in the electronic or other health record, independently interpreting results and communicating results to the patient/family/caregiver, or care coordinator.

## 2025-05-05 ENCOUNTER — OFFICE VISIT (OUTPATIENT)
Dept: ORTHOPEDICS | Facility: CLINIC | Age: 50
End: 2025-05-05
Payer: OTHER GOVERNMENT

## 2025-05-05 VITALS — BODY MASS INDEX: 28.7 KG/M2 | WEIGHT: 200 LBS

## 2025-05-05 DIAGNOSIS — M25.571 RIGHT ANKLE PAIN, UNSPECIFIED CHRONICITY: Primary | ICD-10-CM

## 2025-05-05 DIAGNOSIS — R60.0 EDEMA OF RIGHT FOOT: ICD-10-CM

## 2025-05-05 PROCEDURE — 99213 OFFICE O/P EST LOW 20 MIN: CPT | Mod: PBBFAC | Performed by: ORTHOPAEDIC SURGERY

## 2025-05-05 PROCEDURE — 99214 OFFICE O/P EST MOD 30 MIN: CPT | Mod: S$PBB,,, | Performed by: ORTHOPAEDIC SURGERY

## 2025-05-05 PROCEDURE — 99999 PR PBB SHADOW E&M-EST. PATIENT-LVL III: CPT | Mod: PBBFAC,,, | Performed by: ORTHOPAEDIC SURGERY
